# Patient Record
Sex: FEMALE | Race: WHITE | Employment: FULL TIME | URBAN - METROPOLITAN AREA
[De-identification: names, ages, dates, MRNs, and addresses within clinical notes are randomized per-mention and may not be internally consistent; named-entity substitution may affect disease eponyms.]

---

## 2017-05-27 ENCOUNTER — OFFICE VISIT (OUTPATIENT)
Dept: PRIMARY CARE CLINIC | Age: 52
End: 2017-05-27

## 2017-05-27 VITALS
HEART RATE: 70 BPM | BODY MASS INDEX: 21.53 KG/M2 | OXYGEN SATURATION: 99 % | TEMPERATURE: 97.5 F | WEIGHT: 134 LBS | HEIGHT: 66 IN | DIASTOLIC BLOOD PRESSURE: 68 MMHG | RESPIRATION RATE: 14 BRPM | SYSTOLIC BLOOD PRESSURE: 106 MMHG

## 2017-05-27 DIAGNOSIS — G43.511 INTRACTABLE PERSISTENT MIGRAINE AURA WITHOUT CEREBRAL INFARCTION AND WITH STATUS MIGRAINOSUS: Primary | ICD-10-CM

## 2017-05-27 DIAGNOSIS — J00 ACUTE NASOPHARYNGITIS: ICD-10-CM

## 2017-05-27 PROCEDURE — 99214 OFFICE O/P EST MOD 30 MIN: CPT | Performed by: INTERNAL MEDICINE

## 2017-05-27 RX ORDER — PROMETHAZINE HYDROCHLORIDE AND CODEINE PHOSPHATE 6.25; 1 MG/5ML; MG/5ML
5 SYRUP ORAL EVERY 4 HOURS PRN
Qty: 240 ML | Refills: 0 | Status: SHIPPED | OUTPATIENT
Start: 2017-05-27 | End: 2017-09-26 | Stop reason: SDUPTHER

## 2017-05-27 RX ORDER — BUTALBITAL, ACETAMINOPHEN, CAFFEINE AND CODEINE PHOSPHATE 50; 325; 40; 30 MG/1; MG/1; MG/1; MG/1
1 CAPSULE ORAL EVERY 6 HOURS PRN
Qty: 90 CAPSULE | Refills: 0 | Status: SHIPPED | OUTPATIENT
Start: 2017-05-27 | End: 2017-09-26

## 2017-05-27 RX ORDER — AZITHROMYCIN 250 MG/1
TABLET, FILM COATED ORAL
Qty: 1 PACKET | Refills: 1 | Status: SHIPPED | OUTPATIENT
Start: 2017-05-27 | End: 2017-09-26 | Stop reason: SDUPTHER

## 2017-05-29 ASSESSMENT — ENCOUNTER SYMPTOMS
SINUS PAIN: 1
ABDOMINAL DISTENTION: 0
BLOOD IN STOOL: 0
APNEA: 0
PHOTOPHOBIA: 0
FACIAL SWELLING: 0
RHINORRHEA: 1
CHOKING: 0
ABDOMINAL PAIN: 0

## 2017-09-26 ENCOUNTER — OFFICE VISIT (OUTPATIENT)
Dept: PRIMARY CARE CLINIC | Age: 52
End: 2017-09-26

## 2017-09-26 VITALS
RESPIRATION RATE: 16 BRPM | TEMPERATURE: 99.2 F | HEIGHT: 66 IN | DIASTOLIC BLOOD PRESSURE: 68 MMHG | OXYGEN SATURATION: 98 % | HEART RATE: 106 BPM | BODY MASS INDEX: 21.24 KG/M2 | SYSTOLIC BLOOD PRESSURE: 100 MMHG | WEIGHT: 132.2 LBS

## 2017-09-26 DIAGNOSIS — J30.1 SEASONAL ALLERGIC RHINITIS DUE TO POLLEN: ICD-10-CM

## 2017-09-26 DIAGNOSIS — J20.9 ACUTE BRONCHITIS, UNSPECIFIED ORGANISM: Primary | ICD-10-CM

## 2017-09-26 PROCEDURE — 99213 OFFICE O/P EST LOW 20 MIN: CPT | Performed by: INTERNAL MEDICINE

## 2017-09-26 RX ORDER — BECLOMETHASONE DIPROPIONATE 80 UG/1
1 AEROSOL, METERED NASAL 2 TIMES DAILY
Qty: 3 INHALER | Refills: 3 | Status: SHIPPED | OUTPATIENT
Start: 2017-09-26 | End: 2017-10-16 | Stop reason: SDUPTHER

## 2017-09-26 RX ORDER — PROMETHAZINE HYDROCHLORIDE AND CODEINE PHOSPHATE 6.25; 1 MG/5ML; MG/5ML
5 SYRUP ORAL 4 TIMES DAILY PRN
Qty: 118 ML | Refills: 0 | Status: SHIPPED | OUTPATIENT
Start: 2017-09-26 | End: 2018-09-20

## 2017-09-26 RX ORDER — TRIAMTERENE AND HYDROCHLOROTHIAZIDE 37.5; 25 MG/1; MG/1
TABLET ORAL
Refills: 0 | COMMUNITY
Start: 2017-08-03 | End: 2022-10-25

## 2017-09-26 RX ORDER — AZITHROMYCIN 250 MG/1
TABLET, FILM COATED ORAL
Qty: 1 PACKET | Refills: 1 | Status: SHIPPED | OUTPATIENT
Start: 2017-09-26 | End: 2017-10-06

## 2017-09-26 ASSESSMENT — PATIENT HEALTH QUESTIONNAIRE - PHQ9
2. FEELING DOWN, DEPRESSED OR HOPELESS: 0
SUM OF ALL RESPONSES TO PHQ9 QUESTIONS 1 & 2: 0
1. LITTLE INTEREST OR PLEASURE IN DOING THINGS: 0
SUM OF ALL RESPONSES TO PHQ QUESTIONS 1-9: 0

## 2017-09-26 ASSESSMENT — ENCOUNTER SYMPTOMS
RHINORRHEA: 1
FACIAL SWELLING: 0
APNEA: 0
BLOOD IN STOOL: 0
CHOKING: 0
PHOTOPHOBIA: 0
COUGH: 1
ABDOMINAL DISTENTION: 0

## 2017-10-16 DIAGNOSIS — J30.1 SEASONAL ALLERGIC RHINITIS DUE TO POLLEN: ICD-10-CM

## 2017-10-16 RX ORDER — BECLOMETHASONE DIPROPIONATE 80 UG/1
AEROSOL, METERED NASAL
Qty: 8.7 G | Refills: 3 | Status: SHIPPED | OUTPATIENT
Start: 2017-10-16

## 2018-01-22 ENCOUNTER — OFFICE VISIT (OUTPATIENT)
Dept: PRIMARY CARE CLINIC | Age: 53
End: 2018-01-22
Payer: OTHER GOVERNMENT

## 2018-01-22 VITALS
DIASTOLIC BLOOD PRESSURE: 62 MMHG | WEIGHT: 134.13 LBS | BODY MASS INDEX: 18.17 KG/M2 | HEIGHT: 72 IN | TEMPERATURE: 98.1 F | SYSTOLIC BLOOD PRESSURE: 104 MMHG | RESPIRATION RATE: 16 BRPM | HEART RATE: 78 BPM

## 2018-01-22 DIAGNOSIS — L72.0 EIC (EPIDERMAL INCLUSION CYST): Primary | ICD-10-CM

## 2018-01-22 DIAGNOSIS — D23.9 INTRADERMAL NEVUS: ICD-10-CM

## 2018-01-22 PROCEDURE — 11440 EXC FACE-MM B9+MARG 0.5 CM/<: CPT | Performed by: FAMILY MEDICINE

## 2018-01-22 PROCEDURE — 99213 OFFICE O/P EST LOW 20 MIN: CPT | Performed by: FAMILY MEDICINE

## 2018-01-22 RX ORDER — PROPRANOLOL HYDROCHLORIDE 10 MG/1
TABLET ORAL
Refills: 0 | COMMUNITY
Start: 2017-11-02

## 2018-01-22 ASSESSMENT — ENCOUNTER SYMPTOMS
APNEA: 0
CHOKING: 0
CHEST TIGHTNESS: 0
COLOR CHANGE: 0
EYE REDNESS: 0
NAUSEA: 0
WHEEZING: 0
CONSTIPATION: 0
DIARRHEA: 0
EYE DISCHARGE: 0
PHOTOPHOBIA: 0
FACIAL SWELLING: 0
ABDOMINAL PAIN: 0
STRIDOR: 0
EYE PAIN: 0

## 2018-01-22 NOTE — PROGRESS NOTES
Subjective:      Patient ID: Dave Mancera is a 47 y.o. female who presents today for:  Chief Complaint   Patient presents with    Cyst     Pt. has a cyst on left eye its hard and round X two years. Pt. states there are also spots on her nose and one on the left side of her scalp just past temporal area. Pt states there is no pain along with them just tired of them being there. HPI   Cyst  Patient is here today for cyst on left eye x 2 years. She states that it is hard and round. She also c/o spots on nose and also one on left side of her scalp just past temporal area. She denies pain in the area but is just tired of them being there.      Past Medical History:   Diagnosis Date    Abrasions, from her Pet Rat 5/8/2014    Acne     Allergic rhinitis     Allergic rhinitis 5/23/2012    Alopecia 4/10/2013    Ankle sprain 11/19/2014    Anxiety     Barium enema, cancelled, 11/29/12 11/29/2012    Chronic pain 10/26/2013    Colon stricture, reported 11/29/2012    Depression     Deviated septum 12/18/2012    Diverticulitis 7/24/2012    Diverticulosis 11/29/2012    ETD (eustachian tube dysfunction) 6/6/2016    Fatigue 4/10/2013    H/O endoscopy, 10/12, Perri 11/29/2012    IBS (irritable bowel syndrome) 8/21/2012    Insomnia 6/25/2012    Migraine headache 4/4/2014    Normal colonoscopy, 10/12, Perri 11/29/2012    OCD (obsessive compulsive disorder)     RAD (reactive airway disease) 7/16/2014    Right ankle pain 11/19/2014    Spondylisthesis     Telangiectasia, R ankle 4/4/2014    Tinnitus 6/6/2016     Past Surgical History:   Procedure Laterality Date    EYE SURGERY  1972    LAZY EYES     Family History   Problem Relation Age of Onset    High Blood Pressure Father     Other Father      TRIPLE BYPASS    Glaucoma Father      Social History     Social History    Marital status:      Spouse name: N/A    Number of children: N/A    Years of education: N/A     Occupational History

## 2018-04-11 ENCOUNTER — OFFICE VISIT (OUTPATIENT)
Dept: PRIMARY CARE CLINIC | Age: 53
End: 2018-04-11
Payer: OTHER GOVERNMENT

## 2018-04-11 VITALS
RESPIRATION RATE: 12 BRPM | BODY MASS INDEX: 22.33 KG/M2 | TEMPERATURE: 97.6 F | HEIGHT: 65 IN | DIASTOLIC BLOOD PRESSURE: 66 MMHG | WEIGHT: 134 LBS | SYSTOLIC BLOOD PRESSURE: 106 MMHG | HEART RATE: 64 BPM

## 2018-04-11 DIAGNOSIS — J30.1 ACUTE SEASONAL ALLERGIC RHINITIS DUE TO POLLEN: ICD-10-CM

## 2018-04-11 DIAGNOSIS — G43.511 INTRACTABLE PERSISTENT MIGRAINE AURA WITHOUT CEREBRAL INFARCTION AND WITH STATUS MIGRAINOSUS: ICD-10-CM

## 2018-04-11 DIAGNOSIS — H65.93 BILATERAL SEROUS OTITIS MEDIA, UNSPECIFIED CHRONICITY: Primary | ICD-10-CM

## 2018-04-11 PROCEDURE — 99214 OFFICE O/P EST MOD 30 MIN: CPT | Performed by: FAMILY MEDICINE

## 2018-04-11 RX ORDER — FLUCONAZOLE 150 MG/1
TABLET ORAL
Qty: 2 TABLET | Refills: 3 | Status: SHIPPED | OUTPATIENT
Start: 2018-04-11 | End: 2022-09-30

## 2018-04-11 RX ORDER — CEFDINIR 300 MG/1
300 CAPSULE ORAL 2 TIMES DAILY
Qty: 20 CAPSULE | Refills: 0 | Status: SHIPPED | OUTPATIENT
Start: 2018-04-11 | End: 2018-04-21

## 2018-04-11 RX ORDER — PREDNISONE 10 MG/1
TABLET ORAL
Qty: 30 TABLET | Refills: 0 | Status: SHIPPED | OUTPATIENT
Start: 2018-04-11 | End: 2018-09-20

## 2018-04-11 RX ORDER — BUTALBITAL, ACETAMINOPHEN, CAFFEINE AND CODEINE PHOSPHATE 50; 325; 40; 30 MG/1; MG/1; MG/1; MG/1
1 CAPSULE ORAL EVERY 6 HOURS PRN
Qty: 30 CAPSULE | Refills: 0 | Status: SHIPPED | OUTPATIENT
Start: 2018-04-11 | End: 2018-07-10

## 2018-04-11 ASSESSMENT — ENCOUNTER SYMPTOMS
GASTROINTESTINAL NEGATIVE: 1
SHORTNESS OF BREATH: 0
PHOTOPHOBIA: 0
COUGH: 0
RESPIRATORY NEGATIVE: 1
ABDOMINAL PAIN: 0
CONSTIPATION: 0
SWOLLEN GLANDS: 0
EYE ITCHING: 0
DIARRHEA: 0
SORE THROAT: 0
WHEEZING: 0
EYES NEGATIVE: 1
EYE REDNESS: 0
SINUS PRESSURE: 0
HOARSE VOICE: 0
BACK PAIN: 0

## 2018-05-09 ENCOUNTER — OFFICE VISIT (OUTPATIENT)
Dept: PRIMARY CARE CLINIC | Age: 53
End: 2018-05-09
Payer: OTHER GOVERNMENT

## 2018-05-09 VITALS
BODY MASS INDEX: 21.21 KG/M2 | SYSTOLIC BLOOD PRESSURE: 120 MMHG | RESPIRATION RATE: 14 BRPM | DIASTOLIC BLOOD PRESSURE: 80 MMHG | OXYGEN SATURATION: 95 % | HEART RATE: 64 BPM | HEIGHT: 66 IN | WEIGHT: 132 LBS | TEMPERATURE: 97.8 F

## 2018-05-09 DIAGNOSIS — L30.9 ECZEMA, UNSPECIFIED TYPE: Primary | ICD-10-CM

## 2018-05-09 DIAGNOSIS — J30.1 ACUTE SEASONAL ALLERGIC RHINITIS DUE TO POLLEN: ICD-10-CM

## 2018-05-09 DIAGNOSIS — L30.9 FACIAL DERMATITIS: ICD-10-CM

## 2018-05-09 PROCEDURE — 99213 OFFICE O/P EST LOW 20 MIN: CPT | Performed by: FAMILY MEDICINE

## 2018-05-09 ASSESSMENT — ENCOUNTER SYMPTOMS
RESPIRATORY NEGATIVE: 1
CONSTIPATION: 0
EYE PAIN: 0
EYE REDNESS: 0
EYE ITCHING: 0
DIARRHEA: 0
GASTROINTESTINAL NEGATIVE: 1
SORE THROAT: 0
WHEEZING: 0
EYES NEGATIVE: 1
BACK PAIN: 0
RHINORRHEA: 0
COUGH: 0
VOMITING: 0
SHORTNESS OF BREATH: 0
ABDOMINAL PAIN: 0
PHOTOPHOBIA: 0
NAIL CHANGES: 0

## 2018-05-24 ENCOUNTER — TELEPHONE (OUTPATIENT)
Dept: PRIMARY CARE CLINIC | Age: 53
End: 2018-05-24

## 2018-05-24 RX ORDER — PIMECROLIMUS 10 MG/G
CREAM TOPICAL 2 TIMES DAILY
Qty: 60 G | Refills: 2 | Status: SHIPPED | OUTPATIENT
Start: 2018-05-24 | End: 2018-09-20

## 2018-09-20 ENCOUNTER — OFFICE VISIT (OUTPATIENT)
Dept: PRIMARY CARE CLINIC | Age: 53
End: 2018-09-20
Payer: OTHER GOVERNMENT

## 2018-09-20 VITALS
WEIGHT: 138 LBS | HEIGHT: 66 IN | TEMPERATURE: 98.1 F | OXYGEN SATURATION: 99 % | HEART RATE: 68 BPM | SYSTOLIC BLOOD PRESSURE: 98 MMHG | BODY MASS INDEX: 22.18 KG/M2 | RESPIRATION RATE: 14 BRPM | DIASTOLIC BLOOD PRESSURE: 70 MMHG

## 2018-09-20 DIAGNOSIS — E78.5 HYPERLIPIDEMIA, UNSPECIFIED HYPERLIPIDEMIA TYPE: ICD-10-CM

## 2018-09-20 DIAGNOSIS — J30.1 SEASONAL ALLERGIC RHINITIS DUE TO POLLEN: ICD-10-CM

## 2018-09-20 DIAGNOSIS — E55.9 VITAMIN D DEFICIENCY: ICD-10-CM

## 2018-09-20 DIAGNOSIS — G43.501 PERSISTENT MIGRAINE AURA WITHOUT CEREBRAL INFARCTION AND WITH STATUS MIGRAINOSUS, NOT INTRACTABLE: ICD-10-CM

## 2018-09-20 DIAGNOSIS — R53.83 FATIGUE, UNSPECIFIED TYPE: Primary | ICD-10-CM

## 2018-09-20 DIAGNOSIS — F41.9 ANXIETY: ICD-10-CM

## 2018-09-20 DIAGNOSIS — J20.9 ACUTE BRONCHITIS, UNSPECIFIED ORGANISM: ICD-10-CM

## 2018-09-20 PROCEDURE — 99214 OFFICE O/P EST MOD 30 MIN: CPT | Performed by: FAMILY MEDICINE

## 2018-09-20 RX ORDER — BUTALBITAL, ACETAMINOPHEN, CAFFEINE AND CODEINE PHOSPHATE 50; 325; 40; 30 MG/1; MG/1; MG/1; MG/1
1 CAPSULE ORAL EVERY 6 HOURS PRN
Qty: 30 CAPSULE | Refills: 0 | Status: SHIPPED | OUTPATIENT
Start: 2018-09-20 | End: 2018-12-19

## 2018-09-20 RX ORDER — AZITHROMYCIN 250 MG/1
TABLET, FILM COATED ORAL
Qty: 1 PACKET | Refills: 0 | Status: SHIPPED | OUTPATIENT
Start: 2018-09-20 | End: 2018-09-24

## 2018-09-20 RX ORDER — PROMETHAZINE HYDROCHLORIDE AND CODEINE PHOSPHATE 6.25; 1 MG/5ML; MG/5ML
5 SYRUP ORAL EVERY 4 HOURS PRN
Qty: 120 ML | Refills: 0 | Status: SHIPPED | OUTPATIENT
Start: 2018-09-20 | End: 2018-09-27

## 2018-09-20 ASSESSMENT — ENCOUNTER SYMPTOMS
BACK PAIN: 0
HEMOPTYSIS: 0
GASTROINTESTINAL NEGATIVE: 1
WHEEZING: 0
HEARTBURN: 0
EYE ITCHING: 0
DIARRHEA: 0
EYES NEGATIVE: 1
RHINORRHEA: 0
SHORTNESS OF BREATH: 0
CHEST TIGHTNESS: 1
COUGH: 1
CONSTIPATION: 0
EYE REDNESS: 0
PHOTOPHOBIA: 0

## 2018-09-20 NOTE — PROGRESS NOTES
VITAL SIGNS: are as recorded. GENERAL:  The patient appears well nourished and well-developed, and with normal affect. No acute respiratory distress. Alert and oriented times 3. No skin rashes. HEENT:  TMs normal bilaterally. Canals and ears normal. Pharynx is clear. Extraocular eye motions intact and pain free. Pupils reactive and equally round. Sclerae and conjunctivae clear, normocephalic and atraumatic. RESPIRATORY:  Clear and equal breath sounds with no acute respiratory distress. HEART: Regular rhythm without murmur, rub or gallop. ABDOMEN:  soft, nontender. No masses, guarding or rebound. Normoactive bowel sounds. LOW BACK: No tenderness to palpation of inferior lumbar spine or either sacroiliac joint area. Assessment:      Diagnosis Orders   1. Fatigue, unspecified type  CBC Auto Differential    TSH without Reflex   2. Persistent migraine aura without cerebral infarction and with status migrainosus, not intractable  butalbital-acetaminophen-caffeine-codeine (FIORICET WITH CODEINE) -12-30 MG per capsule   3. Anxiety     4. Seasonal allergic rhinitis due to pollen     5. Vitamin D deficiency  Vitamin D 25 Hydroxy   6. Hyperlipidemia, unspecified hyperlipidemia type  Comprehensive Metabolic Panel    Lipid Panel   7.  Acute bronchitis, unspecified organism  azithromycin (ZITHROMAX Z-NILAY) 250 MG tablet    promethazine-codeine (PHENERGAN WITH CODEINE) 6.25-10 MG/5ML syrup       Plan:      Orders Placed This Encounter   Procedures    CBC Auto Differential     Standing Status:   Future     Standing Expiration Date:   9/20/2019    Comprehensive Metabolic Panel     Standing Status:   Future     Standing Expiration Date:   9/20/2019    Lipid Panel     Standing Status:   Future     Standing Expiration Date:   10/20/2018     Order Specific Question:   Is Patient Fasting?/# of Hours     Answer:   yes    Vitamin D 25 Hydroxy     Standing Status:   Future     Standing Expiration Date:   9/20/2019    TSH without Reflex     Standing Status:   Future     Standing Expiration Date:   9/20/2019     Orders Placed This Encounter   Medications    butalbital-acetaminophen-caffeine-codeine (FIORICET WITH CODEINE) -62-30 MG per capsule     Sig: Take 1 capsule by mouth every 6 hours as needed for Migraine for up to 90 days. .     Dispense:  30 capsule     Refill:  0    azithromycin (ZITHROMAX Z-NILAY) 250 MG tablet     Sig: Take 2 tablets (500 mg) on Day 1, and then take 1 tablet (250 mg) on days 2 through 5. Dispense:  1 packet     Refill:  0    promethazine-codeine (PHENERGAN WITH CODEINE) 6.25-10 MG/5ML syrup     Sig: Take 5 mLs by mouth every 4 hours as needed for Cough for up to 7 days. .     Dispense:  120 mL     Refill:  0     BW in [place] next    Controlled Substances Monitoring:      Return in about 4 weeks (around 10/18/2018). IRosalie ([facility]), am scribing for and in the presence of Yury Gonzalez MD. Electronically signed by :  Daphne Madden CMA (Jaycee Das MD, personally performed the services described in this documentation, as scribed by Daphne Madden CMA ([facility])   in my presence, and it is both accurate and complete.  Electronically signed by: Yury Gonzalez MD    9/21/18 5:58 AM    Yury Gonzalez MD

## 2018-09-26 ENCOUNTER — TELEPHONE (OUTPATIENT)
Dept: PRIMARY CARE CLINIC | Age: 53
End: 2018-09-26

## 2018-09-26 RX ORDER — CEFDINIR 300 MG/1
300 CAPSULE ORAL 2 TIMES DAILY
Qty: 20 CAPSULE | Refills: 0 | Status: SHIPPED | OUTPATIENT
Start: 2018-09-26 | End: 2018-10-06

## 2018-09-26 NOTE — TELEPHONE ENCOUNTER
Pt is seen you you on Friday. She is not feeling better. She did have an appointment with Dr Mili Yanez but is not feeling better. She had to cancel her an appointment for today. She is asking if you can send a prescription to [facility] in [place].

## 2018-09-27 ENCOUNTER — OFFICE VISIT (OUTPATIENT)
Dept: PRIMARY CARE CLINIC | Age: 53
End: 2018-09-27
Payer: OTHER GOVERNMENT

## 2018-09-27 VITALS
OXYGEN SATURATION: 95 % | TEMPERATURE: 98.5 F | HEART RATE: 68 BPM | SYSTOLIC BLOOD PRESSURE: 112 MMHG | HEIGHT: 66 IN | DIASTOLIC BLOOD PRESSURE: 68 MMHG | WEIGHT: 138 LBS | BODY MASS INDEX: 22.18 KG/M2 | RESPIRATION RATE: 14 BRPM

## 2018-09-27 DIAGNOSIS — F41.9 ANXIETY: ICD-10-CM

## 2018-09-27 DIAGNOSIS — J45.909 MILD REACTIVE AIRWAYS DISEASE, UNSPECIFIED WHETHER PERSISTENT: ICD-10-CM

## 2018-09-27 DIAGNOSIS — H10.9 CONJUNCTIVITIS OF BOTH EYES, UNSPECIFIED CONJUNCTIVITIS TYPE: ICD-10-CM

## 2018-09-27 DIAGNOSIS — R05.9 COUGH: ICD-10-CM

## 2018-09-27 DIAGNOSIS — J30.1 SEASONAL ALLERGIC RHINITIS DUE TO POLLEN: ICD-10-CM

## 2018-09-27 DIAGNOSIS — J20.9 ACUTE BRONCHITIS, UNSPECIFIED ORGANISM: Primary | ICD-10-CM

## 2018-09-27 PROCEDURE — 99214 OFFICE O/P EST MOD 30 MIN: CPT | Performed by: FAMILY MEDICINE

## 2018-09-27 RX ORDER — SULFACETAMIDE SODIUM 100 MG/ML
2 SOLUTION/ DROPS OPHTHALMIC 4 TIMES DAILY
Qty: 15 ML | Refills: 0 | Status: SHIPPED | OUTPATIENT
Start: 2018-09-27 | End: 2018-10-07

## 2018-09-27 RX ORDER — CEFTRIAXONE 1 G/1
1 INJECTION, POWDER, FOR SOLUTION INTRAMUSCULAR; INTRAVENOUS ONCE
Status: CANCELLED | OUTPATIENT
Start: 2018-09-27 | End: 2018-09-27

## 2018-09-27 RX ORDER — PREDNISONE 10 MG/1
TABLET ORAL
Qty: 30 TABLET | Refills: 0 | Status: SHIPPED | OUTPATIENT
Start: 2018-09-27 | End: 2018-10-07

## 2018-09-27 ASSESSMENT — ENCOUNTER SYMPTOMS
ABDOMINAL PAIN: 0
EYE DISCHARGE: 1
APNEA: 0
CHEST TIGHTNESS: 0
FOREIGN BODY SENSATION: 0
HEMOPTYSIS: 0
SORE THROAT: 0
CONSTIPATION: 0
CHOKING: 0
PHOTOPHOBIA: 0
SHORTNESS OF BREATH: 0
EYE ITCHING: 0
EYE REDNESS: 1
EYE PAIN: 0
COUGH: 1
COLOR CHANGE: 0
HEARTBURN: 0
VOMITING: 0
STRIDOR: 0
FACIAL SWELLING: 0
RHINORRHEA: 0
DOUBLE VISION: 0
NAUSEA: 0
WHEEZING: 0
DIARRHEA: 0
BLURRED VISION: 0

## 2018-09-27 ASSESSMENT — PATIENT HEALTH QUESTIONNAIRE - PHQ9
SUM OF ALL RESPONSES TO PHQ QUESTIONS 1-9: 0
2. FEELING DOWN, DEPRESSED OR HOPELESS: 0
1. LITTLE INTEREST OR PLEASURE IN DOING THINGS: 0
SUM OF ALL RESPONSES TO PHQ QUESTIONS 1-9: 0
SUM OF ALL RESPONSES TO PHQ9 QUESTIONS 1 & 2: 0

## 2019-01-28 ENCOUNTER — OFFICE VISIT (OUTPATIENT)
Dept: PRIMARY CARE CLINIC | Age: 54
End: 2019-01-28
Payer: OTHER GOVERNMENT

## 2019-01-28 VITALS
TEMPERATURE: 97.7 F | SYSTOLIC BLOOD PRESSURE: 130 MMHG | HEART RATE: 71 BPM | HEIGHT: 66 IN | RESPIRATION RATE: 14 BRPM | WEIGHT: 134 LBS | DIASTOLIC BLOOD PRESSURE: 80 MMHG | BODY MASS INDEX: 21.53 KG/M2 | OXYGEN SATURATION: 99 %

## 2019-01-28 DIAGNOSIS — G43.501 PERSISTENT MIGRAINE AURA WITHOUT CEREBRAL INFARCTION AND WITH STATUS MIGRAINOSUS, NOT INTRACTABLE: ICD-10-CM

## 2019-01-28 DIAGNOSIS — J45.909 MILD REACTIVE AIRWAYS DISEASE, UNSPECIFIED WHETHER PERSISTENT: ICD-10-CM

## 2019-01-28 DIAGNOSIS — K57.92 DIVERTICULITIS: Primary | ICD-10-CM

## 2019-01-28 PROCEDURE — 99214 OFFICE O/P EST MOD 30 MIN: CPT | Performed by: FAMILY MEDICINE

## 2019-01-28 RX ORDER — BUTALBITAL, ACETAMINOPHEN AND CAFFEINE 50; 325; 40 MG/1; MG/1; MG/1
1 TABLET ORAL EVERY 6 HOURS PRN
Qty: 30 TABLET | Refills: 1 | Status: SHIPPED | OUTPATIENT
Start: 2019-01-28

## 2019-01-28 RX ORDER — BUTALBITAL, ACETAMINOPHEN, CAFFEINE AND CODEINE PHOSPHATE 50; 325; 40; 30 MG/1; MG/1; MG/1; MG/1
1 CAPSULE ORAL EVERY 6 HOURS PRN
Qty: 30 CAPSULE | Refills: 0 | Status: CANCELLED | OUTPATIENT
Start: 2019-01-28 | End: 2019-04-28

## 2019-01-28 RX ORDER — ALBUTEROL SULFATE 90 UG/1
2 AEROSOL, METERED RESPIRATORY (INHALATION) EVERY 6 HOURS PRN
Qty: 3 INHALER | Refills: 3 | Status: SHIPPED | OUTPATIENT
Start: 2019-01-28

## 2019-01-28 ASSESSMENT — ENCOUNTER SYMPTOMS
CONSTIPATION: 0
ABDOMINAL PAIN: 1
SORE THROAT: 0
CHOKING: 0
CHEST TIGHTNESS: 0
COLOR CHANGE: 0
SWOLLEN GLANDS: 0
RHINORRHEA: 0
APNEA: 0
SHORTNESS OF BREATH: 0
VOMITING: 0
WHEEZING: 1
HEMOPTYSIS: 0
SPUTUM PRODUCTION: 0
EYE DISCHARGE: 0
NAUSEA: 0
DIARRHEA: 0
PHOTOPHOBIA: 0
EYE REDNESS: 0
STRIDOR: 0
EYE PAIN: 0
COUGH: 0
FACIAL SWELLING: 0

## 2023-03-13 ENCOUNTER — OFFICE VISIT (OUTPATIENT)
Dept: PRIMARY CARE | Facility: CLINIC | Age: 58
End: 2023-03-13
Payer: OTHER GOVERNMENT

## 2023-03-13 VITALS
TEMPERATURE: 97.6 F | OXYGEN SATURATION: 98 % | SYSTOLIC BLOOD PRESSURE: 109 MMHG | DIASTOLIC BLOOD PRESSURE: 75 MMHG | BODY MASS INDEX: 23.46 KG/M2 | WEIGHT: 141 LBS | HEART RATE: 84 BPM | RESPIRATION RATE: 16 BRPM

## 2023-03-13 DIAGNOSIS — R06.09 DYSPNEA ON EXERTION: ICD-10-CM

## 2023-03-13 DIAGNOSIS — R68.89 FLU-LIKE SYMPTOMS: ICD-10-CM

## 2023-03-13 DIAGNOSIS — J40 BRONCHITIS: Primary | ICD-10-CM

## 2023-03-13 DIAGNOSIS — R06.2 WHEEZING: ICD-10-CM

## 2023-03-13 DIAGNOSIS — R05.3 PERSISTENT COUGH: ICD-10-CM

## 2023-03-13 PROCEDURE — 1036F TOBACCO NON-USER: CPT | Performed by: NURSE PRACTITIONER

## 2023-03-13 PROCEDURE — 87636 SARSCOV2 & INF A&B AMP PRB: CPT

## 2023-03-13 PROCEDURE — U0005 INFEC AGEN DETEC AMPLI PROBE: HCPCS

## 2023-03-13 PROCEDURE — 99213 OFFICE O/P EST LOW 20 MIN: CPT | Performed by: NURSE PRACTITIONER

## 2023-03-13 RX ORDER — BROMPHENIRAMINE MALEATE, PSEUDOEPHEDRINE HYDROCHLORIDE, AND DEXTROMETHORPHAN HYDROBROMIDE 2; 30; 10 MG/5ML; MG/5ML; MG/5ML
5 SYRUP ORAL 4 TIMES DAILY PRN
Qty: 120 ML | Refills: 2 | Status: SHIPPED | OUTPATIENT
Start: 2023-03-13 | End: 2023-03-23

## 2023-03-13 RX ORDER — IBUPROFEN 400 MG/1
400 TABLET ORAL EVERY 6 HOURS
COMMUNITY

## 2023-03-13 RX ORDER — METAXALONE 800 MG/1
800 TABLET ORAL NIGHTLY
COMMUNITY
Start: 2022-06-22 | End: 2023-08-01 | Stop reason: ALTCHOICE

## 2023-03-13 RX ORDER — BUTALBITAL, ACETAMINOPHEN AND CAFFEINE 50; 325; 40 MG/1; MG/1; MG/1
1 TABLET ORAL 3 TIMES DAILY PRN
COMMUNITY
End: 2024-03-05 | Stop reason: ALTCHOICE

## 2023-03-13 RX ORDER — CEFDINIR 300 MG/1
300 CAPSULE ORAL 2 TIMES DAILY
Qty: 20 CAPSULE | Refills: 0 | Status: SHIPPED | OUTPATIENT
Start: 2023-03-13 | End: 2023-03-23

## 2023-03-13 RX ORDER — ZOLPIDEM TARTRATE 10 MG/1
10 TABLET ORAL NIGHTLY PRN
COMMUNITY

## 2023-03-13 RX ORDER — MULTIVITAMIN
1 TABLET ORAL DAILY
COMMUNITY

## 2023-03-13 RX ORDER — ACETAMINOPHEN 500 MG
500 TABLET ORAL EVERY 6 HOURS PRN
COMMUNITY

## 2023-03-13 RX ORDER — ESTRADIOL 0.1 MG/G
CREAM VAGINAL
COMMUNITY

## 2023-03-13 RX ORDER — CYCLOBENZAPRINE HCL 10 MG
10 TABLET ORAL
COMMUNITY
Start: 2023-01-22 | End: 2023-03-28 | Stop reason: SDUPTHER

## 2023-03-13 RX ORDER — PROPRANOLOL HYDROCHLORIDE 10 MG/1
10 TABLET ORAL DAILY
COMMUNITY
Start: 2023-02-28

## 2023-03-13 RX ORDER — ALBUTEROL SULFATE 90 UG/1
2 AEROSOL, METERED RESPIRATORY (INHALATION) EVERY 4 HOURS PRN
Qty: 6.7 G | Refills: 0 | Status: SHIPPED | OUTPATIENT
Start: 2023-03-13 | End: 2024-03-12

## 2023-03-13 RX ORDER — DICLOFENAC SODIUM 10 MG/G
GEL TOPICAL
COMMUNITY
Start: 2021-06-28

## 2023-03-13 RX ORDER — ADHESIVE TAPE 1.5"X360"
TAPE, NON-MEDICATED TOPICAL
COMMUNITY

## 2023-03-13 RX ORDER — ALPRAZOLAM 0.5 MG/1
0.5 TABLET, EXTENDED RELEASE ORAL 3 TIMES DAILY PRN
COMMUNITY

## 2023-03-13 RX ORDER — CELECOXIB 200 MG/1
200 CAPSULE ORAL 2 TIMES DAILY
COMMUNITY
Start: 2020-10-13 | End: 2023-10-09 | Stop reason: ALTCHOICE

## 2023-03-13 RX ORDER — PREDNISONE 10 MG/1
TABLET ORAL
Qty: 30 TABLET | Refills: 0 | Status: SHIPPED | OUTPATIENT
Start: 2023-03-13 | End: 2023-03-23

## 2023-03-13 RX ORDER — ALBUTEROL SULFATE 90 UG/1
AEROSOL, METERED RESPIRATORY (INHALATION)
COMMUNITY
Start: 2022-11-15 | End: 2023-03-13 | Stop reason: WASHOUT

## 2023-03-13 NOTE — PROGRESS NOTES
Subjective   Patient ID: Tiffanie Maldonado is a 57 y.o. female who presents for No chief complaint on file..    HPI     OTC:  Covid Vaccinated:      Review of Systems    Objective   There were no vitals taken for this visit.    Physical Exam    Assessment/Plan

## 2023-03-13 NOTE — PROGRESS NOTES
Subjective   Patient ID: Tiffanie Maldonado is a 57 y.o. female who is with a chief complaint of symptoms of respiratory tract infection.     HPI  Patient is a 57 y.o. female who CONSULTED AT OFFICE CLINIC today. Patient is with complaints of nasal congestion, nasal discharge, headache / sinus pain, sore throat, painful swallowing, red enlarged tonsils, post nasal drip, cough, shortness of breath on exertion, intermittent wheezing, fatigue, muscle ache, loss of sense of taste, and chills. She denies having loss of sense of smell, diarrhea, nor fever. Patient states that present condition started about 5 days ago. Patient denies history of recent travel, exposure to person/people who tested positive for COVID 19, nor exposure to person/people with flu like symptoms. she denies shortness of breath, chest pain, palpitations, nor edema. she stated that she  tried OTC medications which afforded only slight relief of symptoms. she denies nausea, vomiting, abdominal pain, nor any other symptoms.    Patient states she had her COVID vaccine.  Patient states she had the flu shot for this season.  -----------------------------------------------------------  (URI (Onset: 03.09.2023), Nasal Congestion, Headache (Pain Scale: 5 out of 10, Constant. ), Sinusitis (Sinus pressure. ), Ear Fullness (BILATERAL. Right is worst. ), Sore Throat (Comes and goes. Post Nasal Drip. ), Cough (Productive ---yellow to olive green mucous. Coughing fits. Denies Gagging or vomiting. Chest burns from coughing. ), and Fatigue)  note by - Grecia Alvarez MA   ------------------------------------------------------------------------------  Review of Systems  General: no weight loss, generally healthy, (+) fatigue  Head: (+) headaches / sinus pain, no vertigo, no injury  Eyes: no diplopia, no tearing, no pain,   Ears: no change in hearing, no tinnitus, no bleeding, no vertigo  Mouth:  no dental difficulties, no gingival bleeding, (+) sore throat, (+)  loss of sense of taste, (+) painful swallowing, (+) red enlarged tonsils, (+) post nasal drip,   Nose: (+) congestion, (+) discharge, no bleeding, no obstruction, no loss of sense of smell  Neck: no stiffness, no pain, no tenderness, no masses, no bruit  Pulmonary: (+) dyspnea on exertion, (+) intermittent wheezing, no hemoptysis, (+) cough  Cardiovascular: no chest pain, no palpitations, no syncope, no orthopnea  Gastrointestinal: no change in appetite, no dysphagia, no abdominal pains, no diarrhea, no emesis, no melena  Genito Urinary: no dysuria, no urinary urgency, no nocturia, no incontinence, no change in nature of urine  Musculoskeletal: (+) muscle ache, no joint pain, no limitation of range of motion, no paresthesia, no numbness  Constitutional: no fever, (+) chills, no night sweats    Objective   Physical Exam  General: ambulatory, in no acute distress  Head: normocephalic, no lesions, (+) sinus tenderness  Eyes: pink palpebral conjunctiva, anicteric sclerae, PERRLA, EOM's full  Ears: clear external auditory canals, no ear discharge, no bleeding from the ears, tympanic membrane intact  Nose: (+) congested nasal mucosa, (+) yellow mucoid nasal discharge, no bleeding, no obstruction  Throat: (+) erythema, and (+) exudate on posterior pharyngeal wall, no lesion  Neck: supple, no masses, no bruits, no CLADP  Chest: symmetrical chest expansion, no lagging, no retractions, clear breath sounds, no rales, no wheezes  Heart: regular rate, normal rhythm, no heaves, no thrills, no murmurs  Abdomen: soft, non-tender, normoactive bowel sounds, no mass palpated  Musculoskeletal: no limitation of range of motion, no paralysis, no deformity  Extremities: full and equal peripheral pulses, no edema,    Assessment/Plan     DISCHARGE SUMMARY:   Patient was seen and examined. Diagnosis, treatment, treatment options, and possible complications of today's illness discussed and explained to patient. Patient to take medication/s  associated with this visit. Patient may also take OTC analgesic/antipyretic if needed for pain/fever. Advised to increase oral fluid intake. Advised steam inhalation if needed to relieve congestion. Advised warm saline gargle if needed to relieve throat discomfort. Advised to come back if with worsening or persistent symptoms. Patient verbalized understanding of plan of care.    Patient to come back in 7 - 10 days if needed for worsening symptoms.

## 2023-03-14 LAB
FLU A RESULT: NOT DETECTED
FLU B RESULT: NOT DETECTED
SARS-COV-2 RESULT: NOT DETECTED

## 2023-03-14 NOTE — PATIENT INSTRUCTIONS
DISCHARGE SUMMARY:   Patient was seen and examined. Diagnosis, treatment, treatment options, and possible complications of today's illness discussed and explained to patient. Patient to take medication/s associated with this visit. Patient may also take OTC analgesic/antipyretic if needed for pain/fever. Advised to increase oral fluid intake. Advised steam inhalation if needed to relieve congestion. Advised warm saline gargle if needed to relieve throat discomfort. Advised to come back if with worsening or persistent symptoms. Patient verbalized understanding of plan of care.    Patient to come back in 7 - 10 days if needed for worsening symptoms.

## 2023-03-20 ENCOUNTER — OFFICE VISIT (OUTPATIENT)
Dept: PRIMARY CARE | Facility: CLINIC | Age: 58
End: 2023-03-20
Payer: OTHER GOVERNMENT

## 2023-03-20 VITALS
RESPIRATION RATE: 14 BRPM | DIASTOLIC BLOOD PRESSURE: 64 MMHG | OXYGEN SATURATION: 96 % | BODY MASS INDEX: 23.16 KG/M2 | HEART RATE: 68 BPM | HEIGHT: 65 IN | WEIGHT: 139 LBS | SYSTOLIC BLOOD PRESSURE: 102 MMHG

## 2023-03-20 DIAGNOSIS — J40 BRONCHITIS: Primary | ICD-10-CM

## 2023-03-20 PROCEDURE — 99214 OFFICE O/P EST MOD 30 MIN: CPT | Performed by: FAMILY MEDICINE

## 2023-03-20 ASSESSMENT — ENCOUNTER SYMPTOMS
HEADACHES: 1
RHINORRHEA: 1
COUGH: 1
SINUS PAIN: 1

## 2023-03-20 NOTE — PROGRESS NOTES
"Subjective   Patient ID: Tiffanie Maldonado is a 57 y.o. female who presents for Sinusitis (Pt states this has been ongoing for 2 weeks, she seen Chuckie last week and was prescribed cefdinir.), Cough, Headache, and URI.    URI   This is a new problem. The current episode started 1 to 4 weeks ago. The problem has been unchanged. Associated symptoms include congestion, coughing, headaches, rhinorrhea and sinus pain. Treatments tried: cefdinir. The treatment provided mild relief.        Review of Systems   HENT:  Positive for congestion, rhinorrhea and sinus pain.    Respiratory:  Positive for cough.    Neurological:  Positive for headaches.       Objective   /64 (BP Location: Left arm, Patient Position: Sitting, BP Cuff Size: Adult)   Pulse 68   Resp 14   Ht 1.651 m (5' 5\")   Wt 63 kg (139 lb)   SpO2 96%   BMI 23.13 kg/m²     Physical Exam CONSTITUTIONAL- NAD, Pt is A & O x3, Vital signs reviewed per chart.  General Appearance- normal , good hygiene,talks easily  EYES-PERRLA conjunctiva and lids- normal  EARS/NOSE-TM's normal, head normocephalic and atraumatic, naso pharynx-no nasal discharge, no trismus,  oropharynx- normal without exudate  NECK- supple, FROM, without mass  thyroid- NT, normal size, no nodule noted  LYMPH- WNL  CV- RRR without murmur, gallop, or other abnormality.  PULM- CTA bilaterally  Respiratory effort- normal respiratory effort , no retractions or nasal flaring  ABDOMEN- normoactive BS's , soft , NT, no hepatosplenomegaly palpated, or masses. No pulsatile masses noted  extremities no edema,NT  SKIN- no abnormal skin lesions on inspection or palpation  neuro- no focal deficits  PSYCH- pleasant, normal judgement and insight     Rales and rhonchi on exam left>right     Assessment/Plan   Problem List Items Addressed This Visit    None  Visit Diagnoses       Bronchitis    -  Primary    Relevant Medications    fluticasone-umeclidin-vilanter (Trelegy Ellipta) 100-62.5-25 mcg blister with " device    Other Relevant Orders    XR chest 2 views (Completed)             Scribe Attestation  By signing my name below, I, Ag Hart DO  , Scribe   attest that this documentation has been prepared under the direction and in the presence of Ag Hart DO.

## 2023-03-21 RX ORDER — FLUTICASONE FUROATE, UMECLIDINIUM BROMIDE AND VILANTEROL TRIFENATATE 100; 62.5; 25 UG/1; UG/1; UG/1
1 POWDER RESPIRATORY (INHALATION) DAILY
Qty: 28 EACH | Refills: 0 | COMMUNITY
Start: 2023-03-21 | End: 2023-08-01 | Stop reason: ALTCHOICE

## 2023-03-28 ENCOUNTER — TELEPHONE (OUTPATIENT)
Dept: PRIMARY CARE | Facility: CLINIC | Age: 58
End: 2023-03-28

## 2023-03-28 DIAGNOSIS — M19.041 PRIMARY OSTEOARTHRITIS OF BOTH HANDS: ICD-10-CM

## 2023-03-28 DIAGNOSIS — M19.042 PRIMARY OSTEOARTHRITIS OF BOTH HANDS: ICD-10-CM

## 2023-03-28 NOTE — TELEPHONE ENCOUNTER
REFILL REQUEST FOR FLEXERIL: PT HAD AN APPT LAST WEEK AND FORGOT TO ASK      cyclobenzaprine (Flexeril) 10 mg   tablet Take 1 tablet (10 mg) by mouth every 6 hours during the day.     [id number] - [place], [address] Phone: [phone]   Fax: [phone]

## 2023-03-29 RX ORDER — CYCLOBENZAPRINE HCL 10 MG
10 TABLET ORAL NIGHTLY
Qty: 30 TABLET | Refills: 3 | Status: SHIPPED | OUTPATIENT
Start: 2023-03-29

## 2023-08-01 ENCOUNTER — OFFICE VISIT (OUTPATIENT)
Dept: PRIMARY CARE | Facility: CLINIC | Age: 58
End: 2023-08-01
Payer: OTHER GOVERNMENT

## 2023-08-01 VITALS
BODY MASS INDEX: 23.66 KG/M2 | HEIGHT: 65 IN | OXYGEN SATURATION: 98 % | SYSTOLIC BLOOD PRESSURE: 124 MMHG | HEART RATE: 77 BPM | WEIGHT: 142 LBS | DIASTOLIC BLOOD PRESSURE: 68 MMHG | RESPIRATION RATE: 14 BRPM

## 2023-08-01 DIAGNOSIS — M54.12 CERVICAL RADICULOPATHY: ICD-10-CM

## 2023-08-01 DIAGNOSIS — B35.1 ONYCHOMYCOSIS: ICD-10-CM

## 2023-08-01 DIAGNOSIS — M54.2 NECK PAIN: ICD-10-CM

## 2023-08-01 DIAGNOSIS — R53.83 OTHER FATIGUE: ICD-10-CM

## 2023-08-01 DIAGNOSIS — H11.32 SUBCONJUNCTIVAL HEMORRHAGE OF LEFT EYE: ICD-10-CM

## 2023-08-01 PROBLEM — E55.9 VITAMIN D DEFICIENCY: Status: ACTIVE | Noted: 2023-08-01

## 2023-08-01 PROBLEM — F54 PSYCHOLOGICAL FACTORS AFFECTING MEDICAL CONDITION: Status: ACTIVE | Noted: 2023-08-01

## 2023-08-01 PROBLEM — J34.2 DEVIATED SEPTUM: Status: ACTIVE | Noted: 2023-08-01

## 2023-08-01 PROBLEM — M51.36 DISC DEGENERATION, LUMBAR: Status: ACTIVE | Noted: 2023-08-01

## 2023-08-01 PROBLEM — M51.369 DISC DEGENERATION, LUMBAR: Status: ACTIVE | Noted: 2023-08-01

## 2023-08-01 PROBLEM — H90.3 BILATERAL HIGH FREQUENCY SENSORINEURAL HEARING LOSS: Status: ACTIVE | Noted: 2023-08-01

## 2023-08-01 PROCEDURE — 99214 OFFICE O/P EST MOD 30 MIN: CPT | Performed by: FAMILY MEDICINE

## 2023-08-01 RX ORDER — TERBINAFINE HYDROCHLORIDE 250 MG/1
250 TABLET ORAL DAILY
Qty: 30 TABLET | Refills: 2 | Status: SHIPPED | OUTPATIENT
Start: 2023-08-01 | End: 2023-11-14

## 2023-08-01 RX ORDER — CICLOPIROX 80 MG/ML
SOLUTION TOPICAL NIGHTLY
Qty: 6.6 ML | Refills: 3 | Status: SHIPPED | OUTPATIENT
Start: 2023-08-01

## 2023-08-01 ASSESSMENT — ENCOUNTER SYMPTOMS
PSYCHIATRIC NEGATIVE: 1
NEUROLOGICAL NEGATIVE: 1
EYES NEGATIVE: 1
NECK PAIN: 1
RESPIRATORY NEGATIVE: 1
CONSTITUTIONAL NEGATIVE: 1
CARDIOVASCULAR NEGATIVE: 1
GASTROINTESTINAL NEGATIVE: 1
HEMATOLOGIC/LYMPHATIC NEGATIVE: 1
ALLERGIC/IMMUNOLOGIC NEGATIVE: 1
ENDOCRINE NEGATIVE: 1
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
## 2023-08-18 LAB
ALLERGEN ANIMAL: CAT DANDER IGE (KU/L): 2.19 KU/L
ALLERGEN ANIMAL: DOG DANDER IGE (KU/L): 3.77 KU/L
ALLERGEN GRASS: BERMUDA GRASS (CYNODON DACTYLON) IGE (KU/L): <0.1 KU/L
ALLERGEN GRASS: JOHNSON GRASS (SORGHUM HALEPENSE) IGE (KU/L): <0.1 KU/L
ALLERGEN GRASS: MEADOW GRASS, KENTUCKY BLUE (POA PRATENSIS )IGE (KU/L): 0.81 KU/L
ALLERGEN GRASS: TIMOTHY GRASS (PHLEUM PRATENSE) IGE (KU/L): 0.38 KU/L
ALLERGEN INSECT: COCKROACH IGE: <0.1 KU/L
ALLERGEN MICROORGANISM: ALTERNARIA ALTERNATA IGE (KU/L): 0.83 KU/L
ALLERGEN MICROORGANISM: ASPERGILLUS FUMIGATUS IGE (KU/L): <0.1 KU/L
ALLERGEN MICROORGANISM: CLADOSPORIUM HERBARUM IGE (KU/L): 0.11 KU/L
ALLERGEN MICROORGANISM: PENICILLIUM CHRYSOGENUM (P. NOTATUM) IGE (KU/L): <0.1 KU/L
ALLERGEN MITE: DERMATOPHAGOIDES FARINAE (HOUSE DUST MITE) IGE (KU/L): 3.5 KU/L
ALLERGEN MITE: DERMATOPHAGOIDES PTERONYSSINUS (HOUSE DUST MITE) IGE (KU/L): 1.32 KU/L
ALLERGEN TREE: BOX-ELDER (ACER NEGUNDO) IGE (KU/L): <0.1 KU/L
ALLERGEN TREE: COMMON SILVER BIRCH (BETULA VERRUCOSA) IGE (KU/L): <0.1 KU/L
ALLERGEN TREE: COTTONWOOD (POPULUS DELTOIDES) IGE (KU/L): <0.1 KU/L
ALLERGEN TREE: ELM (ULMUS AMERICANA) IGE (KU/L): 0.25 KU/L
ALLERGEN TREE: MAPLE LEAF SYCAMORE, LONDON PLANE IGE (KU/L): <0.1 KU/L
ALLERGEN TREE: MOUNTAIN JUNIPER (JUNIPERUS SABINOIDES) IGE (KU/L): <0.1 KU/L
ALLERGEN TREE: MULBERRY (MORUS ALBA) IGE (KU/L): <0.1 KU/L
ALLERGEN TREE: OAK (QUERCUS ALBA) IGE (KU/L): <0.1 KU/L
ALLERGEN TREE: PECAN, HICKORY (CARYA PECAN) IGE (KU/L): 0.1 KU/L
ALLERGEN TREE: WALNUT IGE: 0.17 KU/L
ALLERGEN TREE: WHITE ASH (FRAXINUS AMERICANA) IGE (KU/L): 0.71 KU/L
ALLERGEN WEED: COMMON PIGWEED (AMARANTHUS RETROFLEXUS) IGE (KU/L): <0.1 KU/L
ALLERGEN WEED: COMMON RAGWEED (AMB. ARTEMISIIFOLIA/A. ELATIOR) IGE (KU/L): 1.41 KU/L
ALLERGEN WEED: GOOSEFOOT, LAMB'S QUARTERS (CHENOPODIUM ALBUM) IGE (KU/L): <0.1 KU/L
ALLERGEN WEED: PLANTAIN (ENGLISH), RIBWORT (PLANTAGO LANCEOLATA) IGE (KU/L): <0.1 KU/L
ALLERGEN WEED: PRICKLY SALTWORT/RUSSIAN THISTLE (SALSOLA KALI) IGE (KU/L): 0.36 KU/L
ALLERGEN WEED: SHEEP SORREL (RUMEX ACETOSELLA) IGE (KU/L): <0.1 KU/L
IGA (MG/DL) IN SER/PLAS: 166 MG/DL (ref 70–400)
IGG (MG/DL) IN SER/PLAS: 772 MG/DL (ref 700–1600)
IGM (MG/DL) IN SER/PLAS: 71 MG/DL (ref 40–230)
IMMUNOCAP IGE: 365 KU/L (ref 0–214)
IMMUNOCAP INTERPRETATION: ABNORMAL

## 2023-08-21 LAB
ALLERGEN ANIMAL: MOUSE EPITHELIUM IGE (KU/L): 73.3 KU/L
COMPLEMENT TOTAL (CH50): 55 U/ML (ref 38.7–89.9)
IMMUNOCAP INTERPRETATION (ARUP): NORMAL
Lab: 23.6 KU/L
PNEUMO SEROTYPE INTERPRETATION: NORMAL
SEROTYPE 1, VIRC: 0.25 UG/ML
SEROTYPE 10A(34), VIRC: 2.75 UG/ML
SEROTYPE 11A(43), VIRC: 0.21 UG/ML
SEROTYPE 12F, VIRC: 0.42 UG/ML
SEROTYPE 14, VIRC: 0.43 UG/ML
SEROTYPE 15B(54), VIRC: 0.21 UG/ML
SEROTYPE 17F, VIRC: 3.5 UG/ML
SEROTYPE 18C(56), VIRC: 0.41 UG/ML
SEROTYPE 19A(57), VIRC: 5.62 UG/ML
SEROTYPE 19F, VIRC: 1.32 UG/ML
SEROTYPE 2, VIRC: 9.3 UG/ML
SEROTYPE 20, VIRC: 1.05 UG/ML
SEROTYPE 22F, VIRC: 0.68 UG/ML
SEROTYPE 23F, VIRC: 0.55 UG/ML
SEROTYPE 3, VIRC: 0.1 UG/ML
SEROTYPE 33F(70), VIRC: 0.16 UG/ML
SEROTYPE 4, VIRC: 0.19 UG/ML
SEROTYPE 5, VIRC: 0.6 UG/ML
SEROTYPE 6B(26), VIRC: 0.24 UG/ML
SEROTYPE 7F(51), VIRC: 0.38 UG/ML
SEROTYPE 8, VIRC: 0.37 UG/ML
SEROTYPE 9N, VIRC: 0.4 UG/ML
SEROTYPE 9V(68), VIRC: 1.32 UG/ML

## 2023-08-30 ENCOUNTER — APPOINTMENT (OUTPATIENT)
Dept: PRIMARY CARE | Facility: CLINIC | Age: 58
End: 2023-08-30
Payer: OTHER GOVERNMENT

## 2023-08-30 ENCOUNTER — TELEMEDICINE (OUTPATIENT)
Dept: PRIMARY CARE | Facility: CLINIC | Age: 58
End: 2023-08-30
Payer: OTHER GOVERNMENT

## 2023-08-30 DIAGNOSIS — J30.1 SEASONAL ALLERGIC RHINITIS DUE TO POLLEN: Primary | ICD-10-CM

## 2023-08-30 DIAGNOSIS — J45.40 MODERATE PERSISTENT ASTHMA WITHOUT COMPLICATION (HHS-HCC): ICD-10-CM

## 2023-08-30 DIAGNOSIS — F41.9 ANXIETY: ICD-10-CM

## 2023-08-30 DIAGNOSIS — E55.9 VITAMIN D DEFICIENCY: ICD-10-CM

## 2023-08-30 PROBLEM — J10.1 INFLUENZA A: Status: RESOLVED | Noted: 2023-08-30 | Resolved: 2023-08-30

## 2023-08-30 PROBLEM — H81.09 ENDOLYMPHATIC HYDROPS: Status: ACTIVE | Noted: 2023-08-30

## 2023-08-30 PROBLEM — G44.009 HEADACHES, CLUSTER: Status: ACTIVE | Noted: 2023-08-30

## 2023-08-30 PROBLEM — R68.82 LIBIDO, DECREASED: Status: ACTIVE | Noted: 2023-08-30

## 2023-08-30 PROBLEM — M65.4 TENOSYNOVITIS, DE QUERVAIN: Status: ACTIVE | Noted: 2023-08-30

## 2023-08-30 PROBLEM — N95.2 VAGINAL ATROPHY: Status: ACTIVE | Noted: 2023-08-30

## 2023-08-30 PROBLEM — B35.1 ONYCHOMYCOSIS: Status: ACTIVE | Noted: 2023-08-30

## 2023-08-30 PROBLEM — J45.909 ASTHMA (HHS-HCC): Status: ACTIVE | Noted: 2023-08-30

## 2023-08-30 PROBLEM — M67.40 GANGLION CYST: Status: ACTIVE | Noted: 2023-08-30

## 2023-08-30 PROBLEM — L20.9 ATOPIC DERMATITIS: Status: ACTIVE | Noted: 2023-08-30

## 2023-08-30 PROCEDURE — 99214 OFFICE O/P EST MOD 30 MIN: CPT | Performed by: FAMILY MEDICINE

## 2023-08-30 RX ORDER — PSEUDOEPHEDRINE HCL 30 MG
30 TABLET ORAL EVERY 6 HOURS PRN
COMMUNITY
Start: 2021-06-28

## 2023-08-30 RX ORDER — DOCUSATE SODIUM 100 MG/1
100 CAPSULE, LIQUID FILLED ORAL 2 TIMES DAILY
COMMUNITY

## 2023-08-30 NOTE — PROGRESS NOTES
Subjective   Patient ID: Tiffanie Maldonado is a 57 y.o. female who presents for Allergy Testing.    HPI   Pt is following up on allergy test results from 8/17/23. Pt states that she has been sick multiple times over the past 5 months and is concerned about having a compromised immune system. Pt would like to get the pneumonia vaccine but because of her age she needs a doctors approval. Pt would like a prescription sent to her pharmacy for Prevnar.     Review of Systems  12 Systems have been reviewed as follows.  Constitutional: Fever, weight gain, weight loss, appetite change, night sweats, fatigue, chills.  Eyes : blurry, double vision, vision, loss, tearing, redness, pain, sensitivity to light, glaucoma.  Ears, nose, mouth, and throat: Hearing loss, ringing in the ears, ear pain, nasal congestion, nasal drainage, nosebleeds, mouth, throat, irritation tooth problem.  Cardiovascular :chest pain, pressure, heart racing, palpitations, sweating, leg swelling, high or low blood pressure  Pulmonary: Cough, yellow or green sputum, blood and sputum, shortness of breath, wheezing  Gastrointestinal: Nausea, vomiting, diarrhea, constipation, pain, blood in stool, or vomitus, heartburn, difficulty swallowing  Genitourinary: incontinence, abnormal bleeding, abnormal discharge, urinary frequency, urinary hesitancy, pain, impotence sexual problem, infection, urinary retention  Musculoskeletal: Pain, stiffness, joint, redness or warmth, arthritis, back pain, weakness, muscle wasting, sprain or fracture  Neuro: Weight weakness, dizziness, change in voice, change in taste change in vision, change in hearing, loss, or change of sensation, trouble walking, balance problems coordination problems, shaking, speech problem  Endocrine , cold or heat intolerance, blood sugar problem, weight gain or loss missed periods hot flashes, sweats, change in body hair, change in libido, increased thirst, increased urination  Heme/lymph: Swelling,  bleeding, problem anemia, bruising, enlarged lymph nodes  Allergic/immunologic: H. plus nasal drip, watery itchy eyes, nasal drainage, immunosuppressed  The above were reviewed and noted negative except as noted in HPI and Problem List.    Objective   There were no vitals taken for this visit.    Physical Exam  Constitutional: Well developed, well nourished, alert and in no acute distress     Assessment/Plan   Problem List Items Addressed This Visit       Vitamin D deficiency    Relevant Orders    Follow Up In [facility] - PCP - Established    Allergic rhinitis - Primary    Relevant Orders    Follow Up In [facility] - PCP - Established    Anxiety    Relevant Orders    Follow Up In [facility] - PCP - Established    Asthma    Relevant Orders    Follow Up In [facility] - PCP - Established     Continue current medications and therapy for chronic medical conditions    Prevnar-20 due to lower immune status      Will fax a recommendation to pharmacy    Virtual Visit - Audio and Visual Communication Real Time

## 2023-09-02 ENCOUNTER — TELEPHONE (OUTPATIENT)
Dept: PRIMARY CARE | Facility: CLINIC | Age: 58
End: 2023-09-02

## 2023-09-02 NOTE — TELEPHONE ENCOUNTER
Patient of Dr. Machuca    Patient is asking about the prevnar 20 . She wants to know if she can get this sent to her pharmacy. She said her specialist sent a prescription over to our office.

## 2023-09-11 DIAGNOSIS — Z23 ENCOUNTER FOR IMMUNIZATION: ICD-10-CM

## 2023-09-11 RX ORDER — PNEUMOCOCCAL 20-VALENT CONJUGATE VACCINE 2.2; 2.2; 2.2; 2.2; 2.2; 2.2; 2.2; 2.2; 2.2; 2.2; 2.2; 2.2; 2.2; 2.2; 2.2; 2.2; 4.4; 2.2; 2.2; 2.2 UG/.5ML; UG/.5ML; UG/.5ML; UG/.5ML; UG/.5ML; UG/.5ML; UG/.5ML; UG/.5ML; UG/.5ML; UG/.5ML; UG/.5ML; UG/.5ML; UG/.5ML; UG/.5ML; UG/.5ML; UG/.5ML; UG/.5ML; UG/.5ML; UG/.5ML; UG/.5ML
0.5 INJECTION, SUSPENSION INTRAMUSCULAR ONCE
Qty: 0.5 ML | Refills: 0 | Status: SHIPPED | OUTPATIENT
Start: 2023-09-11 | End: 2023-09-11

## 2023-09-14 ENCOUNTER — APPOINTMENT (OUTPATIENT)
Dept: PRIMARY CARE | Facility: CLINIC | Age: 58
End: 2023-09-14
Payer: OTHER GOVERNMENT

## 2023-09-29 PROBLEM — J00 NASOPHARYNGITIS: Status: ACTIVE | Noted: 2023-09-29

## 2023-09-29 PROBLEM — R93.89 ABNORMAL ULTRASOUND: Status: ACTIVE | Noted: 2023-09-29

## 2023-09-29 PROBLEM — M54.2 NECK PAIN: Status: ACTIVE | Noted: 2023-09-29

## 2023-09-29 PROBLEM — N39.0 RECURRENT UTI: Status: ACTIVE | Noted: 2023-09-29

## 2023-09-29 PROBLEM — N95.9 PERIMENOPAUSAL DISORDER: Status: ACTIVE | Noted: 2023-09-29

## 2023-09-29 PROBLEM — M25.473 ANKLE EDEMA: Status: ACTIVE | Noted: 2023-09-29

## 2023-09-29 PROBLEM — F52.0 LACK OF SEXUAL DESIRE: Status: ACTIVE | Noted: 2023-09-29

## 2023-09-29 PROBLEM — M77.8 RIGHT ELBOW TENDONITIS: Status: ACTIVE | Noted: 2023-09-29

## 2023-09-29 PROBLEM — R06.2 WHEEZING: Status: ACTIVE | Noted: 2023-09-29

## 2023-09-29 PROBLEM — Z86.19 HISTORY OF CANDIDIASIS: Status: ACTIVE | Noted: 2023-09-29

## 2023-09-29 PROBLEM — J34.89 NASAL CONGESTION WITH RHINORRHEA: Status: ACTIVE | Noted: 2023-09-29

## 2023-09-29 PROBLEM — R09.81 NASAL CONGESTION WITH RHINORRHEA: Status: ACTIVE | Noted: 2023-09-29

## 2023-09-29 PROBLEM — M79.642 PAIN OF LEFT HAND: Status: ACTIVE | Noted: 2023-09-29

## 2023-09-29 PROBLEM — J10.1 INFLUENZA A: Status: ACTIVE | Noted: 2023-09-29

## 2023-09-29 PROBLEM — M25.532 LEFT WRIST PAIN: Status: ACTIVE | Noted: 2023-09-29

## 2023-09-29 PROBLEM — R22.32 LOCALIZED SWELLING ON LEFT HAND: Status: ACTIVE | Noted: 2023-09-29

## 2023-09-29 PROBLEM — R39.15 URINARY URGENCY: Status: ACTIVE | Noted: 2023-09-29

## 2023-09-29 PROBLEM — J32.9 CHRONIC SINUSITIS: Status: ACTIVE | Noted: 2023-09-29

## 2023-09-29 PROBLEM — B37.9 YEAST INFECTION: Status: ACTIVE | Noted: 2023-09-29

## 2023-09-29 PROBLEM — S93.409A ANKLE SPRAIN: Status: ACTIVE | Noted: 2023-09-29

## 2023-09-29 RX ORDER — CEFDINIR 300 MG/1
600 CAPSULE ORAL DAILY
COMMUNITY
End: 2024-03-05 | Stop reason: ALTCHOICE

## 2023-09-29 RX ORDER — BENZONATATE 200 MG/1
200 CAPSULE ORAL 3 TIMES DAILY PRN
COMMUNITY

## 2023-10-03 ENCOUNTER — OFFICE VISIT (OUTPATIENT)
Dept: OPHTHALMOLOGY | Facility: CLINIC | Age: 58
End: 2023-10-03
Payer: OTHER GOVERNMENT

## 2023-10-03 DIAGNOSIS — H52.223 REGULAR ASTIGMATISM OF BOTH EYES: ICD-10-CM

## 2023-10-03 DIAGNOSIS — H52.13 MYOPIA OF BOTH EYES: ICD-10-CM

## 2023-10-03 DIAGNOSIS — H50.22 HYPERTROPIA OF LEFT EYE: ICD-10-CM

## 2023-10-03 DIAGNOSIS — H35.371 EPIRETINAL MEMBRANE (ERM) OF RIGHT EYE: ICD-10-CM

## 2023-10-03 DIAGNOSIS — H52.4 PRESBYOPIA: ICD-10-CM

## 2023-10-03 DIAGNOSIS — H53.2 DIPLOPIA: Primary | ICD-10-CM

## 2023-10-03 PROCEDURE — 92014 COMPRE OPH EXAM EST PT 1/>: CPT | Performed by: OPHTHALMOLOGY

## 2023-10-03 PROCEDURE — 92134 CPTRZ OPH DX IMG PST SGM RTA: CPT | Mod: BILATERAL PROCEDURE | Performed by: OPHTHALMOLOGY

## 2023-10-03 ASSESSMENT — REFRACTION_MANIFEST
OS_ADD: +2.25
OD_AXIS: 015
OS_SPHERE: -3.75
OS_AXIS: 180
OD_SPHERE: -3.25
OS_CYLINDER: -3.50
OD_CYLINDER: -3.50
OD_ADD: +2.25

## 2023-10-03 ASSESSMENT — REFRACTION_WEARINGRX
OS_SPHERE: -3.75
OS_CYLINDER: -3.75
OD_AXIS: 013
OD_CYLINDER: -3.75
OD_SPHERE: -3.25
OS_CYLINDER: -4.50
OD_ADD: +2.25
OS_AXIS: 180
OD_SPHERE: -3.25
OS_AXIS: 005
OS_SPHERE: -4.50
OS_ADD: +2.25
OD_ADD: +2.00
OD_CYLINDER: -3.25
OS_ADD: +2.00
OD_AXIS: 013

## 2023-10-03 ASSESSMENT — SLIT LAMP EXAM - LIDS
COMMENTS: NORMAL
COMMENTS: NORMAL

## 2023-10-03 ASSESSMENT — PACHYMETRY
OD_CT(UM): 525
OS_CT(UM): 503

## 2023-10-03 ASSESSMENT — TONOMETRY
OS_IOP_MMHG: 14
IOP_METHOD: GOLDMANN APPLANATION
OD_IOP_MMHG: 16

## 2023-10-03 ASSESSMENT — CUP TO DISC RATIO
OD_RATIO: 0.35
OS_RATIO: 0.35

## 2023-10-03 ASSESSMENT — VISUAL ACUITY
OS_SC: 20/25-
METHOD: SNELLEN - LINEAR
OD_SC: 20/20-2

## 2023-10-03 ASSESSMENT — EXTERNAL EXAM - LEFT EYE: OS_EXAM: NORMAL

## 2023-10-03 ASSESSMENT — EXTERNAL EXAM - RIGHT EYE: OD_EXAM: NORMAL
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
## 2023-10-06 ENCOUNTER — APPOINTMENT (OUTPATIENT)
Dept: PHYSICAL THERAPY | Facility: CLINIC | Age: 58
End: 2023-10-06
Payer: OTHER GOVERNMENT

## 2023-10-09 ENCOUNTER — APPOINTMENT (OUTPATIENT)
Dept: OBSTETRICS AND GYNECOLOGY | Facility: CLINIC | Age: 58
End: 2023-10-09
Payer: OTHER GOVERNMENT

## 2023-10-09 VITALS — WEIGHT: 145 LBS | SYSTOLIC BLOOD PRESSURE: 110 MMHG | BODY MASS INDEX: 24.13 KG/M2 | DIASTOLIC BLOOD PRESSURE: 70 MMHG

## 2023-10-09 DIAGNOSIS — R23.2 VASOMOTOR FLUSHING: Primary | ICD-10-CM

## 2023-10-09 PROCEDURE — 99214 OFFICE O/P EST MOD 30 MIN: CPT | Performed by: OBSTETRICS & GYNECOLOGY

## 2023-10-09 PROCEDURE — 1036F TOBACCO NON-USER: CPT | Performed by: OBSTETRICS & GYNECOLOGY

## 2023-10-09 RX ORDER — ESTRADIOL/NORETHINDRONE ACETATE TRANSDERMAL SYSTEM .05; .14 MG/D; MG/D
1 PATCH, EXTENDED RELEASE TRANSDERMAL 2 TIMES WEEKLY
Qty: 24 PATCH | Refills: 3 | Status: SHIPPED | OUTPATIENT
Start: 2023-10-09 | End: 2026-07-13

## 2023-10-09 NOTE — PROGRESS NOTES
GYN PROGRESS NOTE          CC:     Chief Complaint   Patient presents with    Menopause     Est pt here for hormone changes the interosa helps some she is also using estradiol if she is not using both its not happy still having some discomfort. she's having increased hot flashes, states she more crabby.  Weight gain.  Talking about bio-identical    Chaperone student       HPI:  Patient answers are not available for this visit.  HPI       Menopause     Additional comments: Est pt here for hormone changes the interosa helps some she is also using estradiol if she is not using both its not happy still having some discomfort. she's having increased hot flashes, states she more crabby.  Weight gain.  Talking about bio-identical    Chaperone student          Last edited by Hanane Love MA on 10/9/2023  3:49 PM.      Hot flashes HRT counseling performed reviewed options discussed that she wants bioidentical hormones discussed the risk of off label use of medications and the availability of on label medications    Recently was on testosterone trial is inconsistent with use currently using Intrarosa every other day discussed discontinuing both of these things      ROS:  GEN - no fevers or chills  RESP - no SOB or cough  GYN - see HPI      HISTORY:  Past Medical History:   Diagnosis Date    Influenza A 08/30/2023    Personal history of cervical dysplasia 09/08/2015    History of cervical dysplasia    Personal history of other diseases of the digestive system 09/08/2015    History of diverticulitis of colon     Past Surgical History:   Procedure Laterality Date    CERVICAL BIOPSY  W/ LOOP ELECTRODE EXCISION  09/08/2015    Cervical Loop Electrosurgical Excision (LEEP)    OTHER SURGICAL HISTORY  11/18/2021    Wrist surgery     Social History     Socioeconomic History    Marital status:      Spouse name: Not on file    Number of children: Not on file    Years of education: Not on file    Highest education level: Not on  file   Occupational History    Not on file   Tobacco Use    Smoking status: Never    Smokeless tobacco: Never   Vaping Use    Vaping Use: Never used   Substance and Sexual Activity    Alcohol use: Never    Drug use: Never    Sexual activity: Defer   Other Topics Concern    Not on file   Social History Narrative    Not on file     Social Determinants of Health     Financial Resource Strain: Not on file   Food Insecurity: Not on file   Transportation Needs: Not on file   Physical Activity: Not on file   Stress: Not on file   Social Connections: Not on file   Intimate Partner Violence: Not on file   Housing Stability: Not on file     Cancer-related family history is not on file.       PHYSICAL EXAM:  /70   Wt 65.8 kg (145 lb)   BMI 24.13 kg/m²   GEN:  A&O, NAD  HEENT:  head HC/AT, no visible goiter  PSYCH:  normal affect, anxious      IMPRESSION/PLAN:      CombiPatch estradiol  Start Intrarosa  Stop testosterone  Follow-up for Pap smear declined today      Colton Lay MD

## 2023-10-09 NOTE — PROGRESS NOTES
GYN PROGRESS NOTE              CC:          Chief Complaint   Patient presents with    Menopause       Est pt here for hormone changes the interosa helps some she is also using estradiol if she is not using both its not happy still having some discomfort. she's having increased hot flashes, states she more crabby.  Weight gain.  Talking about bio-identical    Chaperone student         HPI:  Patient answers are not available for this visit.  HPI     Tiffanie Maldonado is a 57 y.o her today for hormone changes due to menopause.      Menopause     Additional comments: Est pt here for hormone changes the interosa helps some she is also using estradiol if she is not using both its not happy still having some discomfort. she's having increased hot flashes, states she more crabby.  Weight gain.  Talking about bio-identical    Chaperone student            Last edited by Hanane Love MA on 10/9/2023  3:49 PM.                ROS:  GEN - no fevers or chills  RESP - no SOB or cough  GYN - see HPI        HISTORY:  Medical History        Past Medical History:   Diagnosis Date    Influenza A 08/30/2023    Personal history of cervical dysplasia 09/08/2015     History of cervical dysplasia    Personal history of other diseases of the digestive system 09/08/2015     History of diverticulitis of colon         Surgical History         Past Surgical History:   Procedure Laterality Date    CERVICAL BIOPSY  W/ LOOP ELECTRODE EXCISION   09/08/2015     Cervical Loop Electrosurgical Excision (LEEP)    OTHER SURGICAL HISTORY   11/18/2021     Wrist surgery         Social History               Socioeconomic History    Marital status:        Spouse name: Not on file    Number of children: Not on file    Years of education: Not on file    Highest education level: Not on file   Occupational History    Not on file   Tobacco Use    Smoking status: Never    Smokeless tobacco: Never   Vaping Use    Vaping Use: Never used   Substance and Sexual  Activity    Alcohol use: Never    Drug use: Never    Sexual activity: Defer   Other Topics Concern    Not on file   Social History Narrative    Not on file      Social Determinants of Health      Financial Resource Strain: Not on file   Food Insecurity: Not on file   Transportation Needs: Not on file   Physical Activity: Not on file   Stress: Not on file   Social Connections: Not on file   Intimate Partner Violence: Not on file   Housing Stability: Not on file         Cancer-related family history is not on file.         PHYSICAL EXAM:  /70   Wt 65.8 kg (145 lb)   BMI 24.13 kg/m²   GEN:  A&O, NAD  HEENT:  head HC/AT, no visible goiter  PSYCH:  normal affect, non-anxious        IMPRESSION/PLAN:  57 y.o. being assessed for Vasomotor flushing.     CombiPatch estradiol  Start Intrarosa  Stop testosterone  Follow-up for Pap smear declined today      Follow up as scheduled    Phyllis KAPLAN am scribing for virtually, and in the presence of Dr. Colton Lay on  10/09/23 at 4:43 PM     Agree with above, EUSEBIO Momin personally performed the services described in the documentation which was scribed virtually confirm is both complete and accurate. DS     Colton Lay MD

## 2023-10-10 ENCOUNTER — TREATMENT (OUTPATIENT)
Dept: PHYSICAL THERAPY | Facility: CLINIC | Age: 58
End: 2023-10-10
Payer: OTHER GOVERNMENT

## 2023-10-10 ENCOUNTER — LAB (OUTPATIENT)
Dept: LAB | Facility: LAB | Age: 58
End: 2023-10-10
Payer: OTHER GOVERNMENT

## 2023-10-10 DIAGNOSIS — M54.12 CERVICAL RADICULOPATHY, CHRONIC: ICD-10-CM

## 2023-10-10 DIAGNOSIS — R53.83 OTHER FATIGUE: ICD-10-CM

## 2023-10-10 DIAGNOSIS — M54.2 NECK PAIN: Primary | ICD-10-CM

## 2023-10-10 PROCEDURE — 84439 ASSAY OF FREE THYROXINE: CPT

## 2023-10-10 PROCEDURE — 97110 THERAPEUTIC EXERCISES: CPT | Mod: GP | Performed by: PHYSICAL THERAPIST

## 2023-10-10 PROCEDURE — 80053 COMPREHEN METABOLIC PANEL: CPT

## 2023-10-10 PROCEDURE — 80061 LIPID PANEL: CPT

## 2023-10-10 PROCEDURE — 85027 COMPLETE CBC AUTOMATED: CPT

## 2023-10-10 PROCEDURE — 84436 ASSAY OF TOTAL THYROXINE: CPT

## 2023-10-10 PROCEDURE — 97140 MANUAL THERAPY 1/> REGIONS: CPT | Mod: GP | Performed by: PHYSICAL THERAPIST

## 2023-10-10 PROCEDURE — 84443 ASSAY THYROID STIM HORMONE: CPT

## 2023-10-10 PROCEDURE — 36415 COLL VENOUS BLD VENIPUNCTURE: CPT

## 2023-10-10 ASSESSMENT — PAIN - FUNCTIONAL ASSESSMENT: PAIN_FUNCTIONAL_ASSESSMENT: 0-10

## 2023-10-10 ASSESSMENT — PAIN SCALES - GENERAL: PAINLEVEL_OUTOF10: 2

## 2023-10-10 NOTE — PROGRESS NOTES
Physical Therapy    Physical Therapy Treatment    Patient Name: Tiffanie Maldonado  MRN: [id number]  Today's Date: 10/10/2023  Time Calculation  Start Time: 1245  Stop Time: 1327  Time Calculation (min): 42 min      Current Problem  1. Neck pain        2. Cervical radiculopathy, chronic  PT eval and treat          Subjective :     Precautions  Precautions  STEADI Fall Risk Score (The score of 4 or more indicates an increased risk of falling): 0  Precautions Comment: None    Pain  Pain Assessment: 0-10  Pain Score: 2  Pain Location: Neck  Pain Radiating Towards: shoulders    Patient reports that she is being more conscious of her posture and ensuring her shoulders aren't shrugged all the time. States that she has seen improvements in her neck pain.      Therapeutic exercise (39705):   Stepper 5 min c/ MHP to cervical spine   Pulleys 3 min with ipsilateral rotation  UTS 2x30 seconds (B) HELD time   LSS 2x30 seconds (B) HELD time   Seated thor ext over half roll 5/5, 2 min   SAPD Blue, 2x10 (p, resistance)   Rows [place], 2x10 (p, resistance)   ER Green, 2x10 (B)  IR Green, 2x10 (B)   Anti-rotation press GTB 2 min   Reassess OBJ measures NV.     Manual Therapy (64543):    IASTM to (B) UT, (B) LS.    AAROM C/S SB and ROT in sitting NEW     Objective   No objective measures assessed this visit     Assessment:   Able to progress interventions this date d/t improving strength; however, deficits remain impacting patient's ability to maintain proper posture and mechanics of movement contributing to ongoing neck pain and soreness.     Pt is progressing as expected towards goals  This session exercises were progressed (p) or added (NEW) as documented in treatment section.  Pt required minimal cues and demonstration to complete exercises with proper form and responded without adverse effects.  Pt continues to lack strength to support and stabilize shoulder girdle and soft tissue mobility necessary for the completion of baseline  ADLs   Pt will benefit from further therapy to continue to progress towards meeting functional and impairment goals.    Plan:     Continue to progress treatment to address strength, motion, jt mobility, soft tissue mobility, and flexibility deficits impacting patient's return to PLOF unrestricted and symptom free.     Goals:   Goals established in legacy AEMR.

## 2023-10-11 LAB
ALBUMIN SERPL BCP-MCNC: 4.2 G/DL (ref 3.4–5)
ALP SERPL-CCNC: 70 U/L (ref 33–110)
ALT SERPL W P-5'-P-CCNC: 15 U/L (ref 7–45)
ANION GAP SERPL CALC-SCNC: 11 MMOL/L (ref 10–20)
AST SERPL W P-5'-P-CCNC: 16 U/L (ref 9–39)
BILIRUB SERPL-MCNC: 0.4 MG/DL (ref 0–1.2)
BUN SERPL-MCNC: 18 MG/DL (ref 6–23)
CALCIUM SERPL-MCNC: 9.3 MG/DL (ref 8.6–10.6)
CHLORIDE SERPL-SCNC: 105 MMOL/L (ref 98–107)
CHOLEST SERPL-MCNC: 206 MG/DL (ref 0–199)
CHOLESTEROL/HDL RATIO: 3.5
CO2 SERPL-SCNC: 27 MMOL/L (ref 21–32)
CREAT SERPL-MCNC: 0.7 MG/DL (ref 0.5–1.05)
ERYTHROCYTE [DISTWIDTH] IN BLOOD BY AUTOMATED COUNT: 13.1 % (ref 11.5–14.5)
GFR SERPL CREATININE-BSD FRML MDRD: >90 ML/MIN/1.73M*2
GLUCOSE SERPL-MCNC: 86 MG/DL (ref 74–99)
HCT VFR BLD AUTO: 40.2 % (ref 36–46)
HDLC SERPL-MCNC: 58.1 MG/DL
HGB BLD-MCNC: 12.8 G/DL (ref 12–16)
LDLC SERPL CALC-MCNC: 129 MG/DL (ref 140–190)
MCH RBC QN AUTO: 30.1 PG (ref 26–34)
MCHC RBC AUTO-ENTMCNC: 31.8 G/DL (ref 32–36)
MCV RBC AUTO: 95 FL (ref 80–100)
NON HDL CHOLESTEROL: 148 MG/DL (ref 0–149)
NRBC BLD-RTO: 0 /100 WBCS (ref 0–0)
PLATELET # BLD AUTO: 243 X10*3/UL (ref 150–450)
PMV BLD AUTO: 11.2 FL (ref 7.5–11.5)
POTASSIUM SERPL-SCNC: 4.1 MMOL/L (ref 3.5–5.3)
PROT SERPL-MCNC: 6.6 G/DL (ref 6.4–8.2)
RBC # BLD AUTO: 4.25 X10*6/UL (ref 4–5.2)
SODIUM SERPL-SCNC: 139 MMOL/L (ref 136–145)
T4 FREE SERPL-MCNC: 1.2 NG/DL (ref 0.78–1.48)
T4 SERPL-MCNC: 7.8 UG/DL (ref 4.5–11.1)
TRIGL SERPL-MCNC: 93 MG/DL (ref 0–149)
TSH SERPL-ACNC: 2.19 MIU/L (ref 0.44–3.98)
VLDL: 19 MG/DL (ref 0–40)
WBC # BLD AUTO: 6.4 X10*3/UL (ref 4.4–11.3)

## 2023-10-13 ENCOUNTER — TREATMENT (OUTPATIENT)
Dept: PHYSICAL THERAPY | Facility: CLINIC | Age: 58
End: 2023-10-13
Payer: OTHER GOVERNMENT

## 2023-10-13 DIAGNOSIS — M54.12 CERVICAL RADICULOPATHY, CHRONIC: Primary | ICD-10-CM

## 2023-10-13 PROCEDURE — 97110 THERAPEUTIC EXERCISES: CPT | Mod: GP | Performed by: PHYSICAL THERAPIST

## 2023-10-13 ASSESSMENT — PAIN - FUNCTIONAL ASSESSMENT: PAIN_FUNCTIONAL_ASSESSMENT: 0-10

## 2023-10-13 ASSESSMENT — PAIN SCALES - GENERAL: PAINLEVEL_OUTOF10: 2

## 2023-10-13 NOTE — PROGRESS NOTES
Physical Therapy    Physical Therapy Re-assessment    Patient Name: Tiffanie Maldonado  MRN: [id number]  Today's Date: 10/13/2023  Time Calculation  Start Time: 1417  Stop Time: 1501  Time Calculation (min): 44 min      Current Problem  1. Cervical radiculopathy, chronic  PT eval and treat          Subjective :     Precautions  Precautions  Precautions Comment: None    Pain  Pain Assessment: 0-10  Pain Score: 2    Patient reports that she is being more conscious of her posture and ensuring her shoulders aren't shrugged all the time. States that she has seen improvements in her neck pain. Still feels very tight.       Objective   OBJECTIVE MEASURES TAKEN 10/13/23  Upper Extremity Strength:  Date: eval RIGHT LEFT   Shoulder Flexion     Shoulder Abduction     Shoulder ER     Shoulder IR     Elbow Flexion     Elbow Extension     Wrist Flexion     Wrist Extension       Cervical ROM:  Date: eval Percentage    Flexion 40    Extension 60     RIGHT LEFT   Side bend 35 25   Rotation 50 50       Assessment:  Pt has made some progress towards goals.  She notes being more conscious of her posture but still feeling tight every day especially at the end of the day.  Goals are mostly met.      This session exercises were  reviewed in order to improve PT understanding. Pt required minimal cues and demonstration to complete exercises with proper form and responded without adverse effects.  Pt continues to lack strength to support and stabilize shoulder girdle and soft tissue mobility necessary for the completion of baseline ADLs   Pt will benefit from further therapy to continue to progress towards meeting functional and impairment goals.    Plan:     Discharge next visit    Therapeutic exercise ([id number]):   Stepper 5 min c/ MHP to cervical spine   Pulleys 3 min with ipsilateral rotation  UTS 2x30 seconds (B) HELD time   LSS 2x30 seconds (B) HELD time   Seated thor ext over half roll 5/5, 2 min   SAPD Blue, 2x10 (p, resistance)   Rows  Grey, 2x10 (p, resistance)   ER Green, 2x10 (B)  IR Green, 2x10 (B)   Anti-rotation press GTB 2 min     Manual Therapy ([id number]): HELD TODAY TIME   IASTM to (B) UT, (B) LS.    AAROM C/S SB and ROT in sitting NEW     **NV discharge to ind HEP    Goals:  . Goals: Goals set and discussed today.      1. pain no higher than 2/10 NOT PROGRESSING  2. cervical rot AROM to at least 50 deg MET  3. UE strength to at least 4/5 MET  4. pt will be able to drive for 2 hours without increased pain  MET  5. NDI to 4%

## 2023-10-16 ENCOUNTER — APPOINTMENT (OUTPATIENT)
Dept: PHYSICAL THERAPY | Facility: CLINIC | Age: 58
End: 2023-10-16
Payer: OTHER GOVERNMENT

## 2023-10-20 ENCOUNTER — TREATMENT (OUTPATIENT)
Dept: PHYSICAL THERAPY | Facility: CLINIC | Age: 58
End: 2023-10-20
Payer: OTHER GOVERNMENT

## 2023-10-20 DIAGNOSIS — M54.12 CERVICAL RADICULOPATHY, CHRONIC: ICD-10-CM

## 2023-10-20 PROCEDURE — 97110 THERAPEUTIC EXERCISES: CPT | Mod: GP | Performed by: PHYSICAL THERAPIST

## 2023-10-20 ASSESSMENT — PAIN DESCRIPTION - DESCRIPTORS: DESCRIPTORS: TIGHTNESS

## 2023-10-20 ASSESSMENT — PAIN - FUNCTIONAL ASSESSMENT: PAIN_FUNCTIONAL_ASSESSMENT: 0-10

## 2023-10-20 ASSESSMENT — PAIN SCALES - GENERAL: PAINLEVEL_OUTOF10: 3

## 2023-10-20 NOTE — PROGRESS NOTES
Physical Therapy    Physical Therapy Treatment/Discharge    Patient Name: Tiffanie Maldonado  MRN: [id number]  Today's Date: 10/20/2023  Time Calculation  Start Time: 1321  Stop Time: 1405  Time Calculation (min): 44 min      Current Problem  1. Cervical radiculopathy, chronic  PT eval and treat          Subjective :     Precautions  Precautions  Precautions Comment: None    Pain  Pain Assessment: 0-10  Pain Score: 3  Pain Location: Neck  Pain Descriptors: Tightness    Patient reports she continues to have tightness in her neck but attributes this to pillow positioning while sleeping. Reports she feels comfortable to continue with her exercises at home.     Therapeutic exercise ([id number]):   Stepper 5 min  Pulleys 3 min with ipsilateral rotation  UTS 2x30 seconds (B)   LSS 2x30 seconds (B)  Seated thor ext over half roll 5/5, 2 min   SAPD Blue, 2x10   Rows Grey, 2x10   ER/IR Blue, 2x10 (B)   Anti-rotation press Blue, 2 min (B)    HEP completed, updated, provided, and reviewed    Access Code: [email or web address]  URL: [email or web address]  Date: 10/20/2023  Prepared by: Jennifer Dumas    Program Notes  Place heat and/or ice to your neck for 10-15 minutes as needed to aid in reducing stiffness.     Exercises  - Seated Upper Trapezius Stretch  - 1 x daily - 7 x weekly - 3 sets - 1 reps - 30 seconds  hold  - Seated Levator Scapulae Stretch  - 1 x daily - 7 x weekly - 3 sets - 1 reps - 30 seconds  hold  - Seated Thoracic Lumbar Extension with Pectoralis Stretch  - 1 x daily - 7 x weekly - 3 sets - 10 reps - 5 seconds  hold  - Shoulder extension with resistance - Neutral  - 1 x daily - 7 x weekly - 3 sets - 10 reps  - Standing Shoulder Row with Anchored Resistance  - 1 x daily - 7 x weekly - 3 sets - 10 reps  - Shoulder External Rotation with Anchored Resistance  - 1 x daily - 7 x weekly - 3 sets - 10 reps  - Shoulder Internal Rotation with Resistance  - 1 x daily - 7 x weekly - 3 sets - 10 reps  - Standing  Anti-Rotation Press with Anchored Resistance  - 1 x daily - 7 x weekly - 3 sets - 10 reps - 5 seconds  hold    Patient Education  - Heat  - Ice    Manual Therapy ([id number]):    IASTM to (B) UT, (B) LS.    AAROM C/S SB and ROT in sitting        Objective   No objective measures assessed this visit  - OBJ measures updated last visit.     Outcome Measures:  Other Measures  Neck Disability Index: 14%     Assessment:   Patient is appropriate for d/c from PT at this time. Patient has met goals for PT. Patient has reached max benefit from skilled PT care. They are independent and safe with HEP to continue with self-care of condition at home with HEP.     Plan:   No further skilled care.     Goals:   . Goals: Goals set and discussed today.      1. pain no higher than 2/10 NOT PROGRESSING  2. cervical rot AROM to at least 50 deg MET  3. UE strength to at least 4/5 MET  4. pt will be able to drive for 2 hours without increased pain  MET  5. NDI to 4%       Physical Therapy    Discharge Summary    Name: Tiffanie Maldonado  MRN: [id number]  : 1965  Date: 10/20/23    Discharge Summary: PT    Discharge Information: Date of discharge 10/20/23, Date of last visit 10/20/23, Date of evaluation 23, Number of attended visits 9, Referred by Dr. Ag Hart, and Referred for Neck pain    Therapy Summary: addressed strength, motion, and tissue mobility deficits contributing to pain, stiffness, and postural control deficits.     Discharge Status: met majority of goals, plateau in progress, ind with HEP to continue to manage independently.      Rehab Discharge Reason: Achieved all and/or the most significant goals(s) and Progress plateaued; further improvement possible

## 2023-10-25 ENCOUNTER — OFFICE VISIT (OUTPATIENT)
Dept: PRIMARY CARE | Facility: CLINIC | Age: 58
End: 2023-10-25
Payer: OTHER GOVERNMENT

## 2023-10-25 VITALS
HEIGHT: 64 IN | BODY MASS INDEX: 24.07 KG/M2 | OXYGEN SATURATION: 96 % | SYSTOLIC BLOOD PRESSURE: 102 MMHG | WEIGHT: 141 LBS | HEART RATE: 77 BPM | RESPIRATION RATE: 16 BRPM | DIASTOLIC BLOOD PRESSURE: 64 MMHG

## 2023-10-25 DIAGNOSIS — Z12.31 ENCOUNTER FOR SCREENING MAMMOGRAM FOR MALIGNANT NEOPLASM OF BREAST: ICD-10-CM

## 2023-10-25 DIAGNOSIS — B35.1 ONYCHOMYCOSIS: Primary | ICD-10-CM

## 2023-10-25 DIAGNOSIS — H53.2 DIPLOPIA: ICD-10-CM

## 2023-10-25 DIAGNOSIS — L65.9 ALOPECIA: ICD-10-CM

## 2023-10-25 PROCEDURE — 99214 OFFICE O/P EST MOD 30 MIN: CPT | Performed by: FAMILY MEDICINE

## 2023-10-25 ASSESSMENT — ENCOUNTER SYMPTOMS
DEPRESSION: 0
OCCASIONAL FEELINGS OF UNSTEADINESS: 0
LOSS OF SENSATION IN FEET: 0

## 2023-10-25 ASSESSMENT — PAIN SCALES - GENERAL: PAINLEVEL: 0-NO PAIN

## 2023-10-25 NOTE — PROGRESS NOTES
Subjective   Patient ID: Tiffanie Maldonado is a 57 y.o. female who presents for Nail Problem.    HPI   Pt presents today for follow up on onychomycosis, seen for on 8/1/23. Pt was prescribed Penlac solution and Lamisil. Pt states toe nail looks a little better but has not been constant with applying solution. Pt has been taking Lamisil.     Pt states she discontinued testosterone cream.    Pt had labs and cologuard on 10/10/2023. Reviewed today.    Pt seen Dr. Kemp, ophthalmologist, who advised her to see a neurophthalmology. Pt is seeing them on 10/26/23.     Review of Systems  12 Systems have been reviewed as follows.  Constitutional: Fever, weight gain, weight loss, appetite change, night sweats, fatigue, chills.  Eyes : blurry, double vision, vision, loss, tearing, redness, pain, sensitivity to light, glaucoma.  Ears: nose, mouth, and throat: Hearing loss, ringing in the ears, ear pain, nasal congestion, nasal drainage, nosebleeds, mouth, throat, irritation tooth problem.  Cardiovascular: chest pain, pressure, heart racing, palpitations, sweating, leg swelling, high or low blood pressure  Pulmonary: Cough, yellow or green sputum, blood and sputum, shortness of breath, wheezing  Gastrointestinal: Nausea, vomiting, diarrhea, constipation, pain, blood in stool, or vomitus, heartburn, difficulty swallowing  Genitourinary: incontinence, abnormal bleeding, abnormal discharge, urinary frequency, urinary hesitancy, pain, impotence sexual problem, infection, urinary retention  Musculoskeletal: Pain, stiffness, joint, redness or warmth, arthritis, back pain, weakness, muscle wasting, sprain or fracture  Neuro: Weight weakness, dizziness, change in voice, change in taste change in vision, change in hearing, loss, or change of sensation, trouble walking, balance problems coordination problems, shaking, speech problem  Endocrine: cold or heat intolerance, blood sugar problem, weight gain or loss missed periods hot  "flashes, sweats, change in body hair, change in libido, increased thirst, increased urination  Heme/lymph: Swelling, bleeding, problem anemia, bruising, enlarged lymph nodes  Allergic/immunologic: H. plus nasal drip, watery itchy eyes, nasal drainage, immunosuppressed  The above were reviewed and noted negative except as noted in HPI and Problem List.    Objective   /64 (BP Location: Right arm, Patient Position: Sitting, BP Cuff Size: Adult)   Pulse 77   Resp 16   Ht 1.626 m (5' 4\")   Wt 64 kg (141 lb)   SpO2 96%   BMI 24.20 kg/m²     Physical Exam  CONSTITUTIONAL- NAD, Pt is A & O x3, Vital signs reviewed per chart.  General Appearance- normal , good hygiene,talks easily  EYES-PERRLA conjunctiva and lids- normal  EARS/NOSE-TM's normal, head normocephalic and atraumatic, naso pharynx-no nasal discharge, no trismus,  oropharynx- normal without exudate  NECK- supple, FROM, without mass  thyroid- NT, normal size, no nodule noted  LYMPH- WNL  CV- RRR without murmur, gallop, or other abnormality.  PULM- CTA bilaterally  Respiratory effort- normal respiratory effort , no retractions or nasal flaring  ABDOMEN- normoactive BS's , soft , NT, no hepatosplenomegaly palpated, or masses. No pulsatile masses noted  extremities no edema,NT  SKIN- no abnormal skin lesions on inspection or palpation  neuro- no focal deficits  PSYCH- pleasant, normal judgement and insight    Assessment/Plan   Problem List Items Addressed This Visit             ICD-10-CM    Onychomycosis - Primary B35.1    Diplopia H53.2     Other Visit Diagnoses         Codes    Encounter for screening mammogram for malignant neoplasm of breast     Z12.31    Relevant Orders    BI US breast complete bilateral        Rec'd Rogain use.  Scribe Attestation  By signing my name below, IEnid RMA, Scribe   attest that this documentation has been prepared under the direction and in the presence of Ag Hart DO.    Provider Attestation - Scribe " documentation    All medical record entries made by the Scribe were at my direction and personally dictated by me. I have reviewed the chart and agree that the record accurately reflects my personal performance of the history, physical exam, discussion and plan.

## 2023-11-06 ENCOUNTER — OFFICE VISIT (OUTPATIENT)
Dept: OPHTHALMOLOGY | Facility: CLINIC | Age: 58
End: 2023-11-06
Payer: OTHER GOVERNMENT

## 2023-11-06 DIAGNOSIS — H53.2 DIPLOPIA: Primary | ICD-10-CM

## 2023-11-06 PROCEDURE — 99215 OFFICE O/P EST HI 40 MIN: CPT | Performed by: PSYCHIATRY & NEUROLOGY

## 2023-11-06 PROCEDURE — 92060 SENSORIMOTOR EXAMINATION: CPT | Performed by: PSYCHIATRY & NEUROLOGY

## 2023-11-06 ASSESSMENT — CONF VISUAL FIELD
OS_NORMAL: 1
OD_SUPERIOR_TEMPORAL_RESTRICTION: 0
OD_NORMAL: 1
OD_INFERIOR_NASAL_RESTRICTION: 0
OS_SUPERIOR_TEMPORAL_RESTRICTION: 0
OS_INFERIOR_TEMPORAL_RESTRICTION: 0
OS_SUPERIOR_NASAL_RESTRICTION: 0
OS_INFERIOR_NASAL_RESTRICTION: 0
OD_INFERIOR_TEMPORAL_RESTRICTION: 0
METHOD: COUNTING FINGERS
OD_SUPERIOR_NASAL_RESTRICTION: 0

## 2023-11-06 ASSESSMENT — ENCOUNTER SYMPTOMS
NEUROLOGICAL NEGATIVE: 0
CONSTITUTIONAL NEGATIVE: 0
CARDIOVASCULAR NEGATIVE: 0
ALLERGIC/IMMUNOLOGIC NEGATIVE: 0
GASTROINTESTINAL NEGATIVE: 0
EYES NEGATIVE: 1
MUSCULOSKELETAL NEGATIVE: 0
HEMATOLOGIC/LYMPHATIC NEGATIVE: 0
RESPIRATORY NEGATIVE: 0
PSYCHIATRIC NEGATIVE: 0
ENDOCRINE NEGATIVE: 0

## 2023-11-06 ASSESSMENT — VISUAL ACUITY
METHOD: SNELLEN - LINEAR
OD_CC: 20/20-2
OS_CC: 20/20-3

## 2023-11-06 ASSESSMENT — PACHYMETRY
OD_CT(UM): 525
OS_CT(UM): 503

## 2023-11-06 ASSESSMENT — EXTERNAL EXAM - LEFT EYE: OS_EXAM: NORMAL

## 2023-11-06 ASSESSMENT — SLIT LAMP EXAM - LIDS
COMMENTS: NORMAL
COMMENTS: NORMAL

## 2023-11-06 ASSESSMENT — EXTERNAL EXAM - RIGHT EYE: OD_EXAM: NORMAL

## 2023-11-06 ASSESSMENT — CUP TO DISC RATIO
OS_RATIO: 0.35
OD_RATIO: 0.35

## 2023-11-06 ASSESSMENT — TONOMETRY
IOP_METHOD: TONOPEN
OS_IOP_MMHG: 15
OD_IOP_MMHG: 18

## 2023-11-06 NOTE — PROGRESS NOTES
Assessment and Plan    10/03/2023 OCT macula OD normal foveal contour 306 & OS normal foveal contour 200.    This 58 year-old woman with a history of strabismus surgery 1972, IBS, acne, asthma presents for evaluation of ocular misalignment.    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 She chance of a new or different problem, such as a mass lesion affecting cranial nerve (CN) IV is small, but worth consideration. I doubt ocular myasthenia or thyroid eye disease without more signs and with such consistency from Dr. Edouard to now. We discussed whether to pursue imaging versus a combination of Fresnel for now with possible permanent prism and/or further strabismus surgery in the future.    Plan    Fresnel for now.  Check MRI brain & orbits with contrast.    Follow up in 2-3 months with stereo plates. (Dilated 11/6/2023)

## 2023-11-10 ENCOUNTER — ANCILLARY PROCEDURE (OUTPATIENT)
Dept: RADIOLOGY | Facility: CLINIC | Age: 58
End: 2023-11-10
Payer: OTHER GOVERNMENT

## 2023-11-10 ENCOUNTER — APPOINTMENT (OUTPATIENT)
Dept: RADIOLOGY | Facility: CLINIC | Age: 58
End: 2023-11-10
Payer: OTHER GOVERNMENT

## 2023-11-10 DIAGNOSIS — Z12.31 ENCOUNTER FOR SCREENING MAMMOGRAM FOR MALIGNANT NEOPLASM OF BREAST: ICD-10-CM

## 2023-11-10 PROCEDURE — 76642 ULTRASOUND BREAST LIMITED: CPT | Mod: BILATERAL PROCEDURE | Performed by: STUDENT IN AN ORGANIZED HEALTH CARE EDUCATION/TRAINING PROGRAM

## 2023-11-10 PROCEDURE — 76641 ULTRASOUND BREAST COMPLETE: CPT | Mod: 50

## 2023-11-13 DIAGNOSIS — B35.1 ONYCHOMYCOSIS: ICD-10-CM

## 2023-11-13 NOTE — TELEPHONE ENCOUNTER
Dr Machuca pt     Pt phoned office and is requesting refill     Terbinafine    [facility]

## 2023-11-14 RX ORDER — TERBINAFINE HYDROCHLORIDE 250 MG/1
250 TABLET ORAL DAILY
Qty: 30 TABLET | Refills: 2 | Status: SHIPPED | OUTPATIENT
Start: 2023-11-14 | End: 2024-02-20

## 2023-12-05 ENCOUNTER — HOSPITAL ENCOUNTER (OUTPATIENT)
Dept: RADIOLOGY | Facility: CLINIC | Age: 58
Discharge: HOME | End: 2023-12-05
Payer: OTHER GOVERNMENT

## 2023-12-05 ENCOUNTER — APPOINTMENT (OUTPATIENT)
Dept: OPHTHALMOLOGY | Facility: CLINIC | Age: 58
End: 2023-12-05
Payer: OTHER GOVERNMENT

## 2023-12-05 DIAGNOSIS — H53.2 DIPLOPIA: ICD-10-CM

## 2023-12-05 PROCEDURE — 70543 MRI ORBT/FAC/NCK W/O &W/DYE: CPT | Performed by: RADIOLOGY

## 2023-12-05 PROCEDURE — 70543 MRI ORBT/FAC/NCK W/O &W/DYE: CPT

## 2023-12-05 PROCEDURE — A9575 INJ GADOTERATE MEGLUMI 0.1ML: HCPCS | Performed by: PSYCHIATRY & NEUROLOGY

## 2023-12-05 PROCEDURE — 70553 MRI BRAIN STEM W/O & W/DYE: CPT

## 2023-12-05 PROCEDURE — 2550000001 HC RX 255 CONTRASTS: Performed by: PSYCHIATRY & NEUROLOGY

## 2023-12-05 PROCEDURE — 70553 MRI BRAIN STEM W/O & W/DYE: CPT | Performed by: RADIOLOGY

## 2023-12-05 RX ORDER — GADOTERATE MEGLUMINE 376.9 MG/ML
13 INJECTION INTRAVENOUS
Status: COMPLETED | OUTPATIENT
Start: 2023-12-05 | End: 2023-12-05

## 2023-12-05 RX ADMIN — GADOTERATE MEGLUMINE 13 ML: 376.9 INJECTION INTRAVENOUS at 14:07

## 2023-12-06 ENCOUNTER — APPOINTMENT (OUTPATIENT)
Dept: OPHTHALMOLOGY | Facility: CLINIC | Age: 58
End: 2023-12-06
Payer: OTHER GOVERNMENT

## 2024-01-08 ENCOUNTER — APPOINTMENT (OUTPATIENT)
Dept: OBSTETRICS AND GYNECOLOGY | Facility: CLINIC | Age: 59
End: 2024-01-08
Payer: OTHER GOVERNMENT

## 2024-01-18 ENCOUNTER — OFFICE VISIT (OUTPATIENT)
Dept: OPHTHALMOLOGY | Facility: CLINIC | Age: 59
End: 2024-01-18
Payer: OTHER GOVERNMENT

## 2024-01-18 DIAGNOSIS — H53.2 DIPLOPIA: Primary | ICD-10-CM

## 2024-01-18 DIAGNOSIS — H49.12 PARALYTIC STRABISMUS ASSOCIATED WITH LEFT TROCHLEAR NERVE PALSY: ICD-10-CM

## 2024-01-18 PROCEDURE — 92012 INTRM OPH EXAM EST PATIENT: CPT | Performed by: OPHTHALMOLOGY

## 2024-01-18 ASSESSMENT — SLIT LAMP EXAM - LIDS
COMMENTS: NORMAL
COMMENTS: NORMAL

## 2024-01-18 ASSESSMENT — EXTERNAL EXAM - RIGHT EYE: OD_EXAM: NORMAL

## 2024-01-18 ASSESSMENT — TONOMETRY
OD_IOP_MMHG: 17
IOP_METHOD: GOLDMANN APPLANATION
OS_IOP_MMHG: 15

## 2024-01-18 ASSESSMENT — REFRACTION_WEARINGRX
OS_VPRISM: 6.0
OD_CYLINDER: +3.50
OS_AXIS: 090
OS_SPHERE: -8.25
OD_AXIS: 102
OS_CYLINDER: +4.00
OS_ADD: +2.50
OD_VBASE: UP
OD_SPHERE: -7.00
OD_ADD: +2.50
OS_VBASE: UP
OD_VPRISM: 6.0

## 2024-01-18 ASSESSMENT — VISUAL ACUITY
METHOD: SNELLEN - LINEAR
OD_CC: 20/20

## 2024-01-18 ASSESSMENT — PACHYMETRY
OS_CT(UM): 503
OD_CT(UM): 525

## 2024-01-18 ASSESSMENT — EXTERNAL EXAM - LEFT EYE: OS_EXAM: NORMAL

## 2024-01-18 NOTE — PROGRESS NOTES
Assessment/Plan   Diagnoses and all orders for this visit:  Diplopia  Pr is doing better with press-on prisms -- interested in exploring surgical options  Refer to Dr. 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 Quesada tomorrow to finish workup    .Return for a dilated exam in  12  months or sooner if having any problems

## 2024-01-19 ENCOUNTER — APPOINTMENT (OUTPATIENT)
Dept: OPHTHALMOLOGY | Facility: CLINIC | Age: 59
End: 2024-01-19
Payer: OTHER GOVERNMENT

## 2024-01-19 NOTE — PROGRESS NOTES
Assessment and Plan    12/05/2023 MRI brain & orbits with contrast, which I personally reviewed, shows non-specific bihemispheric white matter FLAIR lesions.    10/03/2023 OCT macula OD normal foveal contour 306 & OS normal foveal contour 200.    This 58 year-old woman  with a history of strabismus surgery 1972, IBS, acne, asthma presents in follow up for evaluation of ocular misalignment previously with left hypertropia overall consistent with cranial nerve (CN) IV palsy.    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 She chance of a new or different problem, such as a mass lesion affecting cranial nerve (CN) IV is small, but worth consideration. I doubt ocular myasthenia or thyroid eye disease without more signs and with such consistency from Dr. Edouard to now. We discussed whether to pursue imaging versus a combination of Fresnel for now with possible permanent prism and/or further strabismus surgery in the future.    Plan    Fresnel for now.  Check MRI brain & orbits with contrast.    Follow up in 2-3 months with stereo plates. (Dilated 11/6/2023)

## 2024-01-26 ENCOUNTER — PREP FOR PROCEDURE (OUTPATIENT)
Dept: OPHTHALMOLOGY | Facility: CLINIC | Age: 59
End: 2024-01-26

## 2024-01-26 ENCOUNTER — CONSULT (OUTPATIENT)
Dept: OPHTHALMOLOGY | Facility: CLINIC | Age: 59
End: 2024-01-26
Payer: OTHER GOVERNMENT

## 2024-01-26 DIAGNOSIS — H52.203 ASTIGMATISM OF BOTH EYES, UNSPECIFIED TYPE: ICD-10-CM

## 2024-01-26 DIAGNOSIS — H49.12 PARALYTIC STRABISMUS ASSOCIATED WITH LEFT TROCHLEAR NERVE PALSY: Primary | ICD-10-CM

## 2024-01-26 DIAGNOSIS — H52.13 HIGH MYOPIA, BILATERAL: ICD-10-CM

## 2024-01-26 PROCEDURE — 99214 OFFICE O/P EST MOD 30 MIN: CPT | Performed by: OPHTHALMOLOGY

## 2024-01-26 PROCEDURE — 92060 SENSORIMOTOR EXAMINATION: CPT | Performed by: OPHTHALMOLOGY

## 2024-01-26 ASSESSMENT — REFRACTION_WEARINGRX
OS_VBASE: UP
OS_ADD: +2.50
OS_SPHERE: -8.25
OD_ADD: +2.50
OD_SPHERE: -7.00
OD_VBASE: UP
OD_CYLINDER: +3.50
OD_VPRISM: 6.0
OD_AXIS: 102
OS_CYLINDER: +4.00
OS_VPRISM: 6.0
OS_AXIS: 090

## 2024-01-26 ASSESSMENT — ENCOUNTER SYMPTOMS
GASTROINTESTINAL NEGATIVE: 0
ALLERGIC/IMMUNOLOGIC NEGATIVE: 0
RESPIRATORY NEGATIVE: 0
MUSCULOSKELETAL NEGATIVE: 0
PSYCHIATRIC NEGATIVE: 0
NEUROLOGICAL NEGATIVE: 0
HEMATOLOGIC/LYMPHATIC NEGATIVE: 0
EYES NEGATIVE: 1
CARDIOVASCULAR NEGATIVE: 0
CONSTITUTIONAL NEGATIVE: 0
ENDOCRINE NEGATIVE: 0

## 2024-01-26 ASSESSMENT — PACHYMETRY
OS_CT(UM): 503
OD_CT(UM): 525

## 2024-01-26 ASSESSMENT — VISUAL ACUITY
OD_CC+: -1
OS_CC: 20/25
OS_CC+: -2+2
CORRECTION_TYPE: GLASSES
METHOD: SNELLEN - LINEAR
OD_CC: 20/20

## 2024-01-26 ASSESSMENT — CONF VISUAL FIELD: COMMENTS: NOT ASSESSED

## 2024-01-26 ASSESSMENT — SLIT LAMP EXAM - LIDS
COMMENTS: NORMAL
COMMENTS: NORMAL

## 2024-01-26 ASSESSMENT — EXTERNAL EXAM - RIGHT EYE: OD_EXAM: NORMAL

## 2024-01-26 ASSESSMENT — EXTERNAL EXAM - LEFT EYE: OS_EXAM: NORMAL

## 2024-01-26 NOTE — PROGRESS NOTES
NP with history of childhood exotropia, reports strabismus surgery as a child. Today examination is consistent with left cranial nerve (CN) IV palsy. She has a LH(T) of 10 at distance, which worsens on left head tilt, with mild excyclotorsion of 3 degrees.     Discussed with patient that options are observation vs prisms vs surgery. At this point she would like to think about it.   I reviewed the risks and benefits of the proposed strabismus surgery including the possibility of over or undercorrection and the potential need for reoperation in the near or distant future.  I discussed the possible lack of binocular vision despite surgery and the possibility of postoperative diplopia.  There is a chance that glasses or prisms could be necessary in the postoperative period.  I also discussed the risks of infection, hemorrhage, loss of vision or complications from general anesthesia and other surgical imponderables.  All questions were reviewed and answered.   F/u in 3 months otherwise.

## 2024-01-31 ENCOUNTER — APPOINTMENT (OUTPATIENT)
Dept: OPHTHALMOLOGY | Facility: CLINIC | Age: 59
End: 2024-01-31
Payer: OTHER GOVERNMENT

## 2024-02-01 ENCOUNTER — APPOINTMENT (OUTPATIENT)
Dept: OPHTHALMOLOGY | Facility: CLINIC | Age: 59
End: 2024-02-01
Payer: OTHER GOVERNMENT

## 2024-02-08 ENCOUNTER — OFFICE VISIT (OUTPATIENT)
Dept: OPHTHALMOLOGY | Facility: CLINIC | Age: 59
End: 2024-02-08
Payer: OTHER GOVERNMENT

## 2024-02-08 DIAGNOSIS — H49.12 PARALYTIC STRABISMUS ASSOCIATED WITH LEFT TROCHLEAR NERVE PALSY: ICD-10-CM

## 2024-02-08 DIAGNOSIS — H53.2 DIPLOPIA: Primary | ICD-10-CM

## 2024-02-08 PROCEDURE — 92060 SENSORIMOTOR EXAMINATION: CPT | Performed by: PSYCHIATRY & NEUROLOGY

## 2024-02-08 PROCEDURE — 99213 OFFICE O/P EST LOW 20 MIN: CPT | Performed by: PSYCHIATRY & NEUROLOGY

## 2024-02-08 RX ORDER — LYSINE HCL 100 %
POWDER (GRAM) MISCELLANEOUS
COMMUNITY

## 2024-02-08 ASSESSMENT — EXTERNAL EXAM - LEFT EYE: OS_EXAM: NORMAL

## 2024-02-08 ASSESSMENT — ENCOUNTER SYMPTOMS
MUSCULOSKELETAL NEGATIVE: 0
GASTROINTESTINAL NEGATIVE: 0
CONSTITUTIONAL NEGATIVE: 0
ALLERGIC/IMMUNOLOGIC NEGATIVE: 0
HEMATOLOGIC/LYMPHATIC NEGATIVE: 0
CARDIOVASCULAR NEGATIVE: 0
NEUROLOGICAL NEGATIVE: 0
PSYCHIATRIC NEGATIVE: 0
EYES NEGATIVE: 1
RESPIRATORY NEGATIVE: 0
ENDOCRINE NEGATIVE: 0

## 2024-02-08 ASSESSMENT — REFRACTION_WEARINGRX
OD_VBASE: UP
OS_SPHERE: -8.25
OS_AXIS: 090
OS_ADD: +2.50
OS_CYLINDER: +4.00
OD_VPRISM: 6.0
OS_VBASE: UP
OD_CYLINDER: +3.50
OD_AXIS: 102
OD_SPHERE: -7.00
OD_ADD: +2.50
OS_VPRISM: 6.0

## 2024-02-08 ASSESSMENT — CONF VISUAL FIELD
OS_SUPERIOR_NASAL_RESTRICTION: 0
OD_SUPERIOR_NASAL_RESTRICTION: 0
OS_INFERIOR_NASAL_RESTRICTION: 0
METHOD: COUNTING FINGERS
OS_NORMAL: 1
OD_INFERIOR_NASAL_RESTRICTION: 0
OS_INFERIOR_TEMPORAL_RESTRICTION: 0
OD_INFERIOR_TEMPORAL_RESTRICTION: 0
OS_SUPERIOR_TEMPORAL_RESTRICTION: 0
OD_SUPERIOR_TEMPORAL_RESTRICTION: 0
OD_NORMAL: 1

## 2024-02-08 ASSESSMENT — VISUAL ACUITY
OD_CC: 20/20
CORRECTION_TYPE: GLASSES
OS_CC+: -2
METHOD: SNELLEN - LINEAR

## 2024-02-08 ASSESSMENT — SLIT LAMP EXAM - LIDS
COMMENTS: NORMAL
COMMENTS: NORMAL

## 2024-02-08 ASSESSMENT — PACHYMETRY
OD_CT(UM): 525
OS_CT(UM): 503

## 2024-02-08 ASSESSMENT — EXTERNAL EXAM - RIGHT EYE: OD_EXAM: NORMAL

## 2024-02-08 ASSESSMENT — TONOMETRY
IOP_METHOD: GOLDMANN APPLANATION
OS_IOP_MMHG: 20
OD_IOP_MMHG: 20

## 2024-02-08 ASSESSMENT — CUP TO DISC RATIO
OD_RATIO: 0.35
OS_RATIO: 0.35

## 2024-02-08 NOTE — PROGRESS NOTES
Assessment and Plan    12/05/2023 MRI brain & orbits with contrast, which I personally reviewed previously, shows non-specific bihemispheric white matter FLAIR lesions.    10/03/2023 OCT macula OD normal foveal contour 306 & OS normal foveal contour 200.    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    I see no change in the previously measured left hypertropia with incomitance that follows the pattern of cranial nerve (CN) IV palsy. I see no reason to suspect thyroid eye disease or ocular myasthenia, and imaging was negative for a structural lesion affecting cranial nerve (CN) IV. I recommend Fresnel for now with strabismus surgery when/if she is ready.    Plan    Fresnel for now.  Strabismus care per Dr. Preston or Javan.    Follow up depending on surgical outcome with stereo plates. (Dilated 11/6/2023)

## 2024-02-08 NOTE — LETTER
February 8, 2024     Yana Edouard MD  [address] [place] [address] Of  Ophthalmology  [facility] [id number]    Patient: Tiffanie Maldonado   YOB: 1965   Date of Visit: 2/8/2024     Dear Dr. Yana Edouard MD:    I am writing to share my findings regarding our shared patient Tiffanie Maldonado from her visit with me on 2/8/2024.    HPI    PT saw Dr Preston and has questions about strabismus surgery from a neurological perspective. 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    She had a telemedicine appointment with Dr. Yana Edouard of the [facility] 12/19/2023.    She saw strabismus surgeon Dr. Laurie Preston 1/26/2024 and is inclined toward pursuing strabismus surgery.  Last edited by Shakeel Quesada MD PhD on 2/8/2024  4:31 PM.        Diagnoses    There are no diagnoses linked to this encounter.  Assessment and Plan    12/05/2023 MRI brain & orbits with contrast, which I personally reviewed previously, shows non-specific bihemispheric white matter FLAIR lesions.    10/03/2023 OCT macula OD normal foveal contour 306 & OS normal foveal contour 200.    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    I see no change in the previously measured left hypertropia with incomitance that follows the pattern of cranial nerve (CN) IV palsy. I see no reason to suspect thyroid eye disease or ocular myasthenia, and imaging was negative for a structural lesion affecting cranial nerve (CN) IV. I recommend Cat for now  with strabismus surgery when/if she is ready.    Plan    Fresnel for now.  Strabismus care per Dr. Preston or Javan.    Follow up depending on surgical outcome with stereo plates. (Dilated 11/6/2023)      Below you will find my full examination. I appreciate the opportunity to see Tiffanie Maldonado today and to share in her care with you. Please contact me if you have questions for me regarding this visit or if I can be of assistance to another of your patients with neuro-ophthalmological problems.    Sincerely,    Shakeel Quesada MD PhD    CC:   No Recipients      Base Eye Exam       Visual Acuity (Snellen - Linear)         Right Left    Dist cc 20/20 20/20 fresnel -2      Correction: Glasses              Tonometry (Goldmann Applanation, 4:07 PM)         Right Left    Pressure 20 20              Visual Fields (Counting fingers)         Left Right     Full Full              Extraocular Movement         Right Left     Full Full              Neuro/Psych       Oriented x3: Yes    Mood/Affect: Normal                  Additional Tests       Color         Right Left    Ishihara 11/11 11/11              Stereo       Fly: +    Animals: 3/3    Circles: 5/9                  Strabismus Exam       Reading #1   (Edited by: Shakeel Quesada MD PhD)      Correction: cc      Distance Near Near +3DS N Bifocals    LH(T) 10                  0 0 0   +2 0 0                       0  0   0  0                       0 0 0   0 0 0                   R Tilt L Tilt   LH(T) 8 LH(T) 12                              Reading #2   (Edited by: Shakeel Quesada MD PhD)      Method: Bolwell distance with glasses with Fresnel off      Distance Near Near +3DS N Bifocals                      - - - - - -   - - - - - -            LHT 12           - -  - -  XT 6 - -  - -  XT 6     LHT 18     LHT 10     LHT 10      - - - - - -  XT 6 - - - - - -          LHT 12                     Slit Lamp and Fundus Exam       External Exam         Right Left    External  Normal Normal              Slit Lamp Exam         Right Left    Lids/Lashes Normal Normal    Conjunctiva/Sclera White and quiet White and quiet    Cornea Clear Clear    Anterior Chamber Deep and quiet Deep and quiet    Iris Round and reactive Round and reactive    Lens Clear Clear    Anterior Vitreous Normal Normal              Fundus Exam         Right Left    Disc Normal Normal    C/D Ratio 0.35 0.35    Macula Normal Normal    Vessels Normal Normal    Periphery Normal Normal                  Refraction       Wearing Rx         Sphere Cylinder Axis Add Vert Prism    Right -7.00 +3.50 102 +2.50 6.0 up    Left -8.25 +4.00 090 +2.50 6.0 up

## 2024-02-19 DIAGNOSIS — B35.1 ONYCHOMYCOSIS: ICD-10-CM

## 2024-02-19 NOTE — TELEPHONE ENCOUNTER
pt    Refill on terbinafine (LamISIL) 250 mg tablet   Ov :10/25/23  Pharmacy    [id number] - [id number]

## 2024-02-20 RX ORDER — TERBINAFINE HYDROCHLORIDE 250 MG/1
250 TABLET ORAL DAILY
Qty: 30 TABLET | Refills: 2 | Status: SHIPPED | OUTPATIENT
Start: 2024-02-20

## 2024-02-20 RX ORDER — TERBINAFINE HYDROCHLORIDE 250 MG/1
250 TABLET ORAL DAILY
Qty: 30 TABLET | Refills: 2 | Status: SHIPPED | OUTPATIENT
Start: 2024-02-20 | End: 2024-03-05 | Stop reason: ALTCHOICE

## 2024-03-19 ENCOUNTER — ANESTHESIA (OUTPATIENT)
Dept: OPERATING ROOM | Facility: CLINIC | Age: 59
End: 2024-03-19
Payer: OTHER GOVERNMENT

## 2024-03-19 ENCOUNTER — HOSPITAL ENCOUNTER (OUTPATIENT)
Facility: CLINIC | Age: 59
Setting detail: OUTPATIENT SURGERY
Discharge: HOME | End: 2024-03-19
Attending: OPHTHALMOLOGY | Admitting: OPHTHALMOLOGY
Payer: OTHER GOVERNMENT

## 2024-03-19 ENCOUNTER — ANESTHESIA EVENT (OUTPATIENT)
Dept: OPERATING ROOM | Facility: CLINIC | Age: 59
End: 2024-03-19
Payer: OTHER GOVERNMENT

## 2024-03-19 VITALS
HEART RATE: 51 BPM | HEIGHT: 64 IN | OXYGEN SATURATION: 99 % | DIASTOLIC BLOOD PRESSURE: 75 MMHG | TEMPERATURE: 96.8 F | BODY MASS INDEX: 25.07 KG/M2 | WEIGHT: 146.83 LBS | SYSTOLIC BLOOD PRESSURE: 117 MMHG | RESPIRATION RATE: 16 BRPM

## 2024-03-19 PROBLEM — T88.53XA AWARENESS UNDER ANESTHESIA: Status: ACTIVE | Noted: 2024-03-19

## 2024-03-19 PROCEDURE — 2500000005 HC RX 250 GENERAL PHARMACY W/O HCPCS: Performed by: NURSE ANESTHETIST, CERTIFIED REGISTERED

## 2024-03-19 PROCEDURE — 2500000001 HC RX 250 WO HCPCS SELF ADMINISTERED DRUGS (ALT 637 FOR MEDICARE OP): Performed by: OPHTHALMOLOGY

## 2024-03-19 PROCEDURE — 2500000001 HC RX 250 WO HCPCS SELF ADMINISTERED DRUGS (ALT 637 FOR MEDICARE OP): Performed by: STUDENT IN AN ORGANIZED HEALTH CARE EDUCATION/TRAINING PROGRAM

## 2024-03-19 PROCEDURE — 67314 REVISE EYE MUSCLE: CPT

## 2024-03-19 PROCEDURE — 3700000002 HC GENERAL ANESTHESIA TIME - EACH INCREMENTAL 1 MINUTE: Performed by: OPHTHALMOLOGY

## 2024-03-19 PROCEDURE — 3600000003 HC OR TIME - INITIAL BASE CHARGE - PROCEDURE LEVEL THREE: Performed by: OPHTHALMOLOGY

## 2024-03-19 PROCEDURE — A67314 PR STABISMUS SURG,ONE VERT MUSCLE: Performed by: NURSE ANESTHETIST, CERTIFIED REGISTERED

## 2024-03-19 PROCEDURE — 7100000002 HC RECOVERY ROOM TIME - EACH INCREMENTAL 1 MINUTE: Performed by: OPHTHALMOLOGY

## 2024-03-19 PROCEDURE — 7100000010 HC PHASE TWO TIME - EACH INCREMENTAL 1 MINUTE: Performed by: OPHTHALMOLOGY

## 2024-03-19 PROCEDURE — A67314 PR STABISMUS SURG,ONE VERT MUSCLE: Performed by: ANESTHESIOLOGY

## 2024-03-19 PROCEDURE — 3600000008 HC OR TIME - EACH INCREMENTAL 1 MINUTE - PROCEDURE LEVEL THREE: Performed by: OPHTHALMOLOGY

## 2024-03-19 PROCEDURE — 7100000009 HC PHASE TWO TIME - INITIAL BASE CHARGE: Performed by: OPHTHALMOLOGY

## 2024-03-19 PROCEDURE — 2500000004 HC RX 250 GENERAL PHARMACY W/ HCPCS (ALT 636 FOR OP/ED): Performed by: NURSE ANESTHETIST, CERTIFIED REGISTERED

## 2024-03-19 PROCEDURE — 2500000004 HC RX 250 GENERAL PHARMACY W/ HCPCS (ALT 636 FOR OP/ED): Performed by: OPHTHALMOLOGY

## 2024-03-19 PROCEDURE — 2720000007 HC OR 272 NO HCPCS: Performed by: OPHTHALMOLOGY

## 2024-03-19 PROCEDURE — A4217 STERILE WATER/SALINE, 500 ML: HCPCS | Performed by: OPHTHALMOLOGY

## 2024-03-19 PROCEDURE — 2500000005 HC RX 250 GENERAL PHARMACY W/O HCPCS: Performed by: OPHTHALMOLOGY

## 2024-03-19 PROCEDURE — 3700000001 HC GENERAL ANESTHESIA TIME - INITIAL BASE CHARGE: Performed by: OPHTHALMOLOGY

## 2024-03-19 PROCEDURE — 7100000001 HC RECOVERY ROOM TIME - INITIAL BASE CHARGE: Performed by: OPHTHALMOLOGY

## 2024-03-19 PROCEDURE — 2500000004 HC RX 250 GENERAL PHARMACY W/ HCPCS (ALT 636 FOR OP/ED): Performed by: STUDENT IN AN ORGANIZED HEALTH CARE EDUCATION/TRAINING PROGRAM

## 2024-03-19 RX ORDER — WATER 1 ML/ML
IRRIGANT IRRIGATION AS NEEDED
Status: DISCONTINUED | OUTPATIENT
Start: 2024-03-19 | End: 2024-03-19 | Stop reason: HOSPADM

## 2024-03-19 RX ORDER — FENTANYL CITRATE 50 UG/ML
INJECTION, SOLUTION INTRAMUSCULAR; INTRAVENOUS AS NEEDED
Status: DISCONTINUED | OUTPATIENT
Start: 2024-03-19 | End: 2024-03-19

## 2024-03-19 RX ORDER — POVIDONE-IODINE 5 %
SOLUTION, NON-ORAL OPHTHALMIC (EYE) AS NEEDED
Status: DISCONTINUED | OUTPATIENT
Start: 2024-03-19 | End: 2024-03-19 | Stop reason: HOSPADM

## 2024-03-19 RX ORDER — NEOMYCIN SULFATE, POLYMYXIN B SULFATE AND DEXAMETHASONE 3.5; 10000; 1 MG/ML; [USP'U]/ML; MG/ML
SUSPENSION/ DROPS OPHTHALMIC AS NEEDED
Status: DISCONTINUED | OUTPATIENT
Start: 2024-03-19 | End: 2024-03-19 | Stop reason: HOSPADM

## 2024-03-19 RX ORDER — SCOLOPAMINE TRANSDERMAL SYSTEM 1 MG/1
PATCH, EXTENDED RELEASE TRANSDERMAL AS NEEDED
Status: DISCONTINUED | OUTPATIENT
Start: 2024-03-19 | End: 2024-03-19

## 2024-03-19 RX ORDER — TETRACAINE HYDROCHLORIDE 5 MG/ML
SOLUTION OPHTHALMIC AS NEEDED
Status: DISCONTINUED | OUTPATIENT
Start: 2024-03-19 | End: 2024-03-19 | Stop reason: HOSPADM

## 2024-03-19 RX ORDER — LIDOCAINE HYDROCHLORIDE 20 MG/ML
INJECTION, SOLUTION INFILTRATION; PERINEURAL AS NEEDED
Status: DISCONTINUED | OUTPATIENT
Start: 2024-03-19 | End: 2024-03-19

## 2024-03-19 RX ORDER — PROPOFOL 10 MG/ML
INJECTION, EMULSION INTRAVENOUS CONTINUOUS PRN
Status: DISCONTINUED | OUTPATIENT
Start: 2024-03-19 | End: 2024-03-19

## 2024-03-19 RX ORDER — GABAPENTIN 300 MG/1
CAPSULE ORAL AS NEEDED
Status: DISCONTINUED | OUTPATIENT
Start: 2024-03-19 | End: 2024-03-19

## 2024-03-19 RX ORDER — PROPOFOL 10 MG/ML
INJECTION, EMULSION INTRAVENOUS AS NEEDED
Status: DISCONTINUED | OUTPATIENT
Start: 2024-03-19 | End: 2024-03-19

## 2024-03-19 RX ORDER — SODIUM CHLORIDE, SODIUM LACTATE, POTASSIUM CHLORIDE, CALCIUM CHLORIDE 600; 310; 30; 20 MG/100ML; MG/100ML; MG/100ML; MG/100ML
INJECTION, SOLUTION INTRAVENOUS CONTINUOUS PRN
Status: DISCONTINUED | OUTPATIENT
Start: 2024-03-19 | End: 2024-03-19

## 2024-03-19 RX ORDER — KETOROLAC TROMETHAMINE 30 MG/ML
INJECTION, SOLUTION INTRAMUSCULAR; INTRAVENOUS AS NEEDED
Status: DISCONTINUED | OUTPATIENT
Start: 2024-03-19 | End: 2024-03-19

## 2024-03-19 RX ORDER — ONDANSETRON HYDROCHLORIDE 2 MG/ML
INJECTION, SOLUTION INTRAVENOUS AS NEEDED
Status: DISCONTINUED | OUTPATIENT
Start: 2024-03-19 | End: 2024-03-19

## 2024-03-19 RX ORDER — GLYCOPYRROLATE 0.2 MG/ML
INJECTION INTRAMUSCULAR; INTRAVENOUS AS NEEDED
Status: DISCONTINUED | OUTPATIENT
Start: 2024-03-19 | End: 2024-03-19

## 2024-03-19 RX ORDER — MIDAZOLAM HYDROCHLORIDE 1 MG/ML
INJECTION, SOLUTION INTRAMUSCULAR; INTRAVENOUS AS NEEDED
Status: DISCONTINUED | OUTPATIENT
Start: 2024-03-19 | End: 2024-03-19

## 2024-03-19 RX ADMIN — GLYCOPYRROLATE 0.2 MG: 0.2 INJECTION INTRAMUSCULAR; INTRAVENOUS at 10:53

## 2024-03-19 RX ADMIN — PROPOFOL 200 MG: 10 INJECTION, EMULSION INTRAVENOUS at 10:42

## 2024-03-19 RX ADMIN — MIDAZOLAM 2 MG: 1 INJECTION INTRAMUSCULAR; INTRAVENOUS at 10:39

## 2024-03-19 RX ADMIN — FENTANYL CITRATE 100 MCG: 50 INJECTION, SOLUTION INTRAMUSCULAR; INTRAVENOUS at 10:42

## 2024-03-19 RX ADMIN — KETOROLAC TROMETHAMINE 30 MG: 30 INJECTION, SOLUTION INTRAMUSCULAR at 11:06

## 2024-03-19 RX ADMIN — PROPOFOL 50 MCG/KG/MIN: 10 INJECTION, EMULSION INTRAVENOUS at 10:46

## 2024-03-19 RX ADMIN — SCOPALAMINE 1 PATCH: 1 PATCH, EXTENDED RELEASE TRANSDERMAL at 10:03

## 2024-03-19 RX ADMIN — DEXAMETHASONE SODIUM PHOSPHATE 4 MG: 4 INJECTION, SOLUTION INTRAMUSCULAR; INTRAVENOUS at 10:51

## 2024-03-19 RX ADMIN — GABAPENTIN 300 MG: 300 CAPSULE ORAL at 10:03

## 2024-03-19 RX ADMIN — LIDOCAINE HYDROCHLORIDE 60 ML: 20 INJECTION, SOLUTION INFILTRATION; PERINEURAL at 10:42

## 2024-03-19 RX ADMIN — ONDANSETRON 4 MG: 2 INJECTION INTRAMUSCULAR; INTRAVENOUS at 11:04

## 2024-03-19 RX ADMIN — SODIUM CHLORIDE, POTASSIUM CHLORIDE, SODIUM LACTATE AND CALCIUM CHLORIDE: 600; 310; 30; 20 INJECTION, SOLUTION INTRAVENOUS at 10:36

## 2024-03-19 SDOH — HEALTH STABILITY: MENTAL HEALTH: CURRENT SMOKER: 0

## 2024-03-19 ASSESSMENT — PAIN SCALES - GENERAL
PAINLEVEL_OUTOF10: 0 - NO PAIN
PAIN_LEVEL: 0
PAINLEVEL_OUTOF10: 4
PAINLEVEL_OUTOF10: 6
PAINLEVEL_OUTOF10: 0 - NO PAIN
PAINLEVEL_OUTOF10: 3
PAINLEVEL_OUTOF10: 5 - MODERATE PAIN

## 2024-03-19 ASSESSMENT — PAIN - FUNCTIONAL ASSESSMENT
PAIN_FUNCTIONAL_ASSESSMENT: 0-10

## 2024-03-19 ASSESSMENT — COLUMBIA-SUICIDE SEVERITY RATING SCALE - C-SSRS
2. HAVE YOU ACTUALLY HAD ANY THOUGHTS OF KILLING YOURSELF?: NO
1. IN THE PAST MONTH, HAVE YOU WISHED YOU WERE DEAD OR WISHED YOU COULD GO TO SLEEP AND NOT WAKE UP?: NO
6. HAVE YOU EVER DONE ANYTHING, STARTED TO DO ANYTHING, OR PREPARED TO DO ANYTHING TO END YOUR LIFE?: NO

## 2024-03-19 NOTE — DISCHARGE INSTRUCTIONS
1. No swimming or pooled water to eye for two weeks, ok to shower  2. Start maxitrol eye drops three times daily, left eye  3. Tylenol/ibuprofen as needed for pain  4. Follow up with pediatric ophthalmology in 3-7 days   TO REACH YOUR PHYSICIAN AFTER HOURS CALL  AND ASK FOR THE PHYSICIAN ON CALL  May have Tylenol six hours after your morning dose taken at home    May have Ibuprofen/advil/motrin/aleve after: 5:00 pm  You may alternate between ibuprofen/motrin/aleve and three hours later you may take tylenol    Laurie Preston MD  Medical Director for Quality, [facility]  Pediatric Ophthalmology  Primary Office:   [facility]  [facility] [address] [address] [place] [address]  Email: [email or web address]  Pager: [phone]  Scheduling Phone: [phone]    Scopalamine patch may stay on for 72 hrs.  Remove patch, discard away form pets, children.  Do not touch eyes!  Wash hands thoroughly

## 2024-03-19 NOTE — ANESTHESIA PROCEDURE NOTES
Airway  Date/Time: 3/19/2024 10:44 AM    Staffing  Performed: CRNA   Authorized by: Fahad Peacock MD    Performed by: OSCAR Aldrich-KYREE  Patient location during procedure: OR    Indications and Patient Condition  Indications for airway management: anesthesia  Spontaneous ventilation: present  Sedation level: deep  Preoxygenated: yes  Patient position: sniffing  MILS maintained throughout  Mask difficulty assessment: 1 - vent by mask    Final Airway Details  Final airway type: supraglottic airway      Successful airway: Size 4     Number of attempts at approach: 1

## 2024-03-19 NOTE — ANESTHESIA POSTPROCEDURE EVALUATION
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 [H49.12])    Surgeons: Laurie Preston MD Responsible Provider: Fahad Peacock MD    Anesthesia Type: general ASA Status: 2            Anesthesia Type: general    Vitals Value Taken Time   BP 95/53 03/19/24 1119   Temp 36.3 °C (97.3 °F) 03/19/24 1119   Pulse 76 03/19/24 1119   Resp 16 03/19/24 1119   SpO2 96 % 03/19/24 1119       Anesthesia Post Evaluation    Patient location during evaluation: PACU  Patient participation: waiting for patient participation  Level of consciousness: sedated  Pain score: 0  Pain management: adequate  Airway patency: patent  Cardiovascular status: acceptable  Respiratory status: acceptable  Hydration status: acceptable  Postoperative Nausea and Vomiting: none        There were no known notable events for this encounter.

## 2024-03-19 NOTE — ANESTHESIA PREPROCEDURE EVALUATION
Patient: Tiffanie Maldonado    Procedure Information       Date/Time: 03/19/24 1000    Procedure: Resection Recession Eye (Left)    [facility]    Surgeons: Laurie Preston MD            Relevant Problems   Anesthesia   (+) Awareness under anesthesia      GI   (+) IBS (irritable bowel syndrome)      /Renal   (+) Recurrent UTI      Neuro/Psych   (+) Anxiety   (+) Cervical radiculopathy, chronic   (+) Depression   (+) OCD (obsessive compulsive disorder)      Pulmonary   (+) Asthma      Musculoskeletal   (+) Disc degeneration, lumbar   (+) Osteoarthritis of hands, bilateral      Eyes, Ears, Nose, and Throat   (+) Bilateral high frequency sensorineural hearing loss      Infectious Disease   (+) Influenza A   (+) Onychomycosis   (+) Recurrent UTI   (+) Yeast infection     Past Surgical History:   Procedure Laterality Date    CERVICAL BIOPSY  W/ LOOP ELECTRODE EXCISION  09/08/2015    Cervical Loop Electrosurgical Excision (LEEP)    OTHER SURGICAL HISTORY  11/18/2021    Wrist surgery    STRABISMUS SURGERY       Allergies   Allergen Reactions    Meloxicam Other and Dizziness    Moxifloxacin Dizziness, Other and Unknown    Doxycycline Hyclate Rash       Clinical information reviewed:   Tobacco  Allergies  Meds  Problems  Med Hx  Surg Hx   Fam Hx  Soc   Hx        NPO Detail:  No data recorded     Physical Exam    Airway  Mallampati: I  TM distance: >3 FB  Neck ROM: full     Cardiovascular - normal exam  Rate: normal     Dental        Pulmonary - normal exam     Abdominal            Anesthesia Plan    History of general anesthesia?: yes  History of complications of general anesthesia?: no    ASA 2     general     The patient is not a current smoker.    intravenous induction   Anesthetic plan and risks discussed with patient and spouse.    Plan discussed with attending.

## 2024-03-19 NOTE — H&P
History Of Present Illness  Tiffanie Maldonado is a 58 y.o. female presenting with LH(T) 2/2 L cranial nerve (CN) IV palsy here for left eye muscle surgery.      Past Medical History  Past Medical History:   Diagnosis Date    Anxiety     Influenza A 08/30/2023    Personal history of cervical dysplasia 09/08/2015    History of cervical dysplasia    Personal history of other diseases of the digestive system 09/08/2015    History of diverticulitis of colon    Strabismus        Surgical History  Past Surgical History:   Procedure Laterality Date    CERVICAL BIOPSY  W/ LOOP ELECTRODE EXCISION  09/08/2015    Cervical Loop Electrosurgical Excision (LEEP)    OTHER SURGICAL HISTORY  11/18/2021    Wrist surgery    STRABISMUS SURGERY          Social History  She reports that she has never smoked. She has never used smokeless tobacco. She reports current alcohol use of about 1.0 standard drink of alcohol per week. She reports that she does not use drugs.    Family History  Family History   Problem Relation Name Age of Onset    Heart disease Mother      Stroke Mother      Diabetes Mother      Heart disease Father      Diabetes Father      Hyperlipidemia Father      Heart disease Maternal Grandmother      Heart disease Maternal Grandfather      Heart disease Paternal Grandmother      Heart disease Paternal Grandfather          Allergies  Meloxicam, Moxifloxacin, and Doxycycline hyclate    Review of Systems  Constitutional: Negative.    HENT: Negative.     Eyes: Negative.    Respiratory: Negative.     Cardiovascular: Negative.    Gastrointestinal: Negative.    Endocrine: Negative.  .    Neurological: Negative.    Psychiatric/Behavioral: Negative.       Physical Exam  General: Alert and Oriented x3  Head and neck: atraumatic and normacephalic  Lungs: The chest wall is symmetric and without deformity. No signs of respiratory distress. Lung sounds are clear in all lobes bilaterally.   Heart: Regular rate and rhythm.   Abdomen: Soft  "and non-tender       Last Recorded Vitals  Blood pressure 129/68, pulse 67, temperature 36 °C (96.8 °F), temperature source Temporal, resp. rate 16, height 1.626 m (5' 4\"), weight 66.6 kg (146 lb 13.2 oz), SpO2 99 %.    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 female presenting with LH(T) 2/2 L cranial nerve (CN) IV palsy here for left eye muscle surgery.            Felicitas Srivastava MD    "

## 2024-03-19 NOTE — OP NOTE
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5mg/mL-10,000 unit/mL-0.1 % ophthalmic suspension 2 drop   sterile water irrigation solution 100 mL   tetracaine (PF) 0.5 % ophthalmic solution 2 drop   povidone-iodine 5 % ophthalmic solution 1 Application              Anesthesia Record               Intraprocedure I/O Totals          Intake    Propofol Drip 0.00 mL    The total shown is the total volume documented since Anesthesia Start was filed.    LR infusion 400.00 mL    Total Intake 400 mL          Specimen: No specimens collected     Staff:   Circulator: Rodolfo Garcia RN  Scrub Person: Niurka Snowden RN         Drains and/or Catheters: * None in log *    Tourniquet Times:         Implants:     Findings: Normal anatomy and forced ductions    Indications: Tiffanie Maldonado is an 58 y.o. 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    The patient was seen in the preoperative area. The risks, benefits, complications, treatment options, non-operative alternatives, expected recovery and outcomes were discussed with  the patient. The possibilities of reaction to medication, pulmonary aspiration, injury to surrounding structures, bleeding, recurrent infection, the need for additional procedures, failure to diagnose a condition, and creating a complication requiring transfusion or operation were discussed with the patient. The patient concurred with the proposed plan, giving informed consent.  The site of surgery was properly noted/marked if necessary per policy. The patient has been actively warmed in preoperative area. Preoperative antibiotics are not indicated. Venous thrombosis prophylaxis are not indicated.    Procedure Details:   The patient was brought to the operating room and was placed in a supine position. After the patient was positively identified through a typical time-out procedure, the patient received anesthesia and an LMA. Then the left eye was prepped and draped in the usual sterile ophthalmic fashion. An inferotemporal fornix conjunctival incision was performed. The inferior rectus muscles was identified and hooked. Lateral rectus muscle had been previously recessed and was not visible in this plane. Using these hooks, the eye was turned superonasally. The inferior oblique was then identified and freed from the soft surrounding tissues without disrupting the orbital septum or the vortex vein. The muscle was clamped at the level of the insertion to the globe and at the level of the intermuscular septum. The muscle was cauterized using handheld high temperature cautery. The muscle was then cut at the level of cauterization, and the clamps were removed. The conjunctiva was then closed with 2 interrupted 8-0 polysorb sutures in a buried fashion. Insippisated inferior meibomian glands were noted along central lid margin. Gentle pressure was applied with a cotton swap to express meibum from central and lateral glands. At the end, the left eye was cleaned. Tetracaine, 5% betadine eye solution, and maxitrol drops were  instilled in the eye. The patient was then awakened and the LMA was removed. The patient was transferred to the recovery room in good and stable condition. The patient tolerated the procedure and the anesthesia well.     Complications:  None; patient tolerated the procedure well.    Disposition: PACU - hemodynamically stable.  Condition: stable         Additional Details: none    Attending Attestation:     Laurie Preston  Phone Number: [phone]

## 2024-03-25 ENCOUNTER — TELEPHONE (OUTPATIENT)
Dept: OPHTHALMOLOGY | Facility: CLINIC | Age: 59
End: 2024-03-25

## 2024-03-25 NOTE — TELEPHONE ENCOUNTER
"Patient is 6 days status post (s/p) left inferior oblique myectomy. Called patient and she stated that her concern was from the initial 1-2 days post-op; since then she has felt a lot better. She had been feeling a \"gravel-y\" sensationin the corner of the eye and a dull pain that is resolving. Redness is starting to resolve as well. She has been using maxitrol drops TID and alternating Advil/Tylenol for pain. All questions addressed over the phone.  She has follow up with Dr. Preston tomorrow in [facility]; will keep appointment as scheduled. "

## 2024-03-25 NOTE — TELEPHONE ENCOUNTER
Patient called  stating that she needs a change in her eye drops. Patient is complaining of pain, and swelling. S/P 03/19/24  [phone]

## 2024-03-26 ENCOUNTER — OFFICE VISIT (OUTPATIENT)
Dept: OPHTHALMOLOGY | Facility: CLINIC | Age: 59
End: 2024-03-26
Payer: OTHER GOVERNMENT

## 2024-03-26 DIAGNOSIS — H52.203 ASTIGMATISM OF BOTH EYES, UNSPECIFIED TYPE: ICD-10-CM

## 2024-03-26 DIAGNOSIS — H49.12 PARALYTIC STRABISMUS ASSOCIATED WITH LEFT TROCHLEAR NERVE PALSY: Primary | ICD-10-CM

## 2024-03-26 DIAGNOSIS — H52.13 HIGH MYOPIA, BILATERAL: ICD-10-CM

## 2024-03-26 PROCEDURE — 99024 POSTOP FOLLOW-UP VISIT: CPT | Performed by: OPHTHALMOLOGY

## 2024-03-26 ASSESSMENT — VISUAL ACUITY
METHOD: SNELLEN - LINEAR
OD_CC: 20/20
CORRECTION_TYPE: GLASSES
OS_CC: 20/20

## 2024-03-26 ASSESSMENT — ENCOUNTER SYMPTOMS
EYES NEGATIVE: 0
PSYCHIATRIC NEGATIVE: 0
MUSCULOSKELETAL NEGATIVE: 0
GASTROINTESTINAL NEGATIVE: 0
ENDOCRINE NEGATIVE: 0
RESPIRATORY NEGATIVE: 0
NEUROLOGICAL NEGATIVE: 0
CARDIOVASCULAR NEGATIVE: 0
HEMATOLOGIC/LYMPHATIC NEGATIVE: 0
ALLERGIC/IMMUNOLOGIC NEGATIVE: 0
CONSTITUTIONAL NEGATIVE: 0

## 2024-03-26 ASSESSMENT — PACHYMETRY
OD_CT(UM): 525
OS_CT(UM): 503

## 2024-03-26 ASSESSMENT — EXTERNAL EXAM - RIGHT EYE: OD_EXAM: NORMAL

## 2024-03-26 ASSESSMENT — SLIT LAMP EXAM - LIDS
COMMENTS: NORMAL
COMMENTS: NORMAL

## 2024-03-26 ASSESSMENT — EXTERNAL EXAM - LEFT EYE: OS_EXAM: NORMAL

## 2024-03-26 NOTE — PROGRESS NOTES
58 YOF post op wk1 status post (s/p) left inferior oblique myectomy. Healing well, denies diplopia. Does notice some restriction on left gaze OS. Discussed continuing to move the ye around despite restriction sensation, will help with healing. Can stop the drops now. F/U 2 mo.

## 2024-04-22 ENCOUNTER — TELEPHONE (OUTPATIENT)
Dept: OPHTHALMOLOGY | Facility: CLINIC | Age: 59
End: 2024-04-22

## 2024-04-22 NOTE — TELEPHONE ENCOUNTER
Dr. Preston-Pt stated her L eye is very blurry, she recently had surgery 3/19/24. She stated that something is off. Best contact number attached [phone] or  her  number [phone]. Thank you    Patient called central scheduling

## 2024-05-06 ENCOUNTER — OFFICE VISIT (OUTPATIENT)
Dept: OPHTHALMOLOGY | Facility: CLINIC | Age: 59
End: 2024-05-06
Payer: OTHER GOVERNMENT

## 2024-05-06 DIAGNOSIS — H49.12 PARALYTIC STRABISMUS ASSOCIATED WITH LEFT TROCHLEAR NERVE PALSY: Primary | ICD-10-CM

## 2024-05-06 DIAGNOSIS — H52.203 ASTIGMATISM OF BOTH EYES, UNSPECIFIED TYPE: ICD-10-CM

## 2024-05-06 DIAGNOSIS — H52.13 HIGH MYOPIA, BILATERAL: ICD-10-CM

## 2024-05-06 DIAGNOSIS — H53.2 DIPLOPIA: ICD-10-CM

## 2024-05-06 PROCEDURE — 92015 DETERMINE REFRACTIVE STATE: CPT | Performed by: OPHTHALMOLOGY

## 2024-05-06 PROCEDURE — 99024 POSTOP FOLLOW-UP VISIT: CPT | Performed by: OPHTHALMOLOGY

## 2024-05-06 ASSESSMENT — CONF VISUAL FIELD
OD_INFERIOR_TEMPORAL_RESTRICTION: 0
OS_NORMAL: 1
OS_SUPERIOR_NASAL_RESTRICTION: 0
OD_SUPERIOR_TEMPORAL_RESTRICTION: 0
OS_INFERIOR_NASAL_RESTRICTION: 0
OD_SUPERIOR_NASAL_RESTRICTION: 0
METHOD: COUNTING FINGERS
OS_INFERIOR_TEMPORAL_RESTRICTION: 0
OS_SUPERIOR_TEMPORAL_RESTRICTION: 0
OD_INFERIOR_NASAL_RESTRICTION: 0
OD_NORMAL: 1

## 2024-05-06 ASSESSMENT — ENCOUNTER SYMPTOMS
HEMATOLOGIC/LYMPHATIC NEGATIVE: 0
ENDOCRINE NEGATIVE: 0
CONSTITUTIONAL NEGATIVE: 0
ALLERGIC/IMMUNOLOGIC NEGATIVE: 0
MUSCULOSKELETAL NEGATIVE: 0
PSYCHIATRIC NEGATIVE: 0
RESPIRATORY NEGATIVE: 0
GASTROINTESTINAL NEGATIVE: 0
CARDIOVASCULAR NEGATIVE: 0
NEUROLOGICAL NEGATIVE: 0
EYES NEGATIVE: 1

## 2024-05-06 ASSESSMENT — PACHYMETRY
OS_CT(UM): 503
OD_CT(UM): 525

## 2024-05-06 ASSESSMENT — VISUAL ACUITY
CORRECTION_TYPE: GLASSES
OS_CC+: -2
OS_CC: 20/40
OD_CC: 20/25-1
METHOD: SNELLEN - LINEAR
OS_CC: 20/20
OD_CC: 20/20

## 2024-05-06 ASSESSMENT — REFRACTION_WEARINGRX
OD_ADD: +2.50
OD_SPHERE: -7.00
OS_SPHERE: -8.25
OS_ADD: +2.50
OS_CYLINDER: +4.00
OD_CYLINDER: +3.50
OD_AXIS: 020
OS_AXIS: 090

## 2024-05-06 ASSESSMENT — EXTERNAL EXAM - LEFT EYE: OS_EXAM: NORMAL

## 2024-05-06 ASSESSMENT — SLIT LAMP EXAM - LIDS
COMMENTS: NORMAL
COMMENTS: NORMAL

## 2024-05-06 ASSESSMENT — EXTERNAL EXAM - RIGHT EYE: OD_EXAM: NORMAL

## 2024-05-06 NOTE — PROGRESS NOTES
Pt is healing well, but she has been seeing the left eye moving up when looking at mirror and had an episode of double/blurred vision when looking at music in a performance. I could not reproduce any misalignment today aside from 2PD of X, but encouraged her to try to record the deviation if it comes up at home as the symptoms happening more towards the end of the day. Gave updated Rx for glasses (same manifest refraction (MR) from Dr Kemp's assessment, but increased the add as well).   F/u in 3-4 months sooner prn.

## 2024-07-08 DIAGNOSIS — Z12.39 ENCOUNTER FOR SCREENING FOR MALIGNANT NEOPLASM OF BREAST, UNSPECIFIED SCREENING MODALITY: ICD-10-CM

## 2024-08-06 ENCOUNTER — APPOINTMENT (OUTPATIENT)
Dept: OPHTHALMOLOGY | Facility: CLINIC | Age: 59
End: 2024-08-06
Payer: OTHER GOVERNMENT

## 2024-08-06 DIAGNOSIS — H52.203 ASTIGMATISM OF BOTH EYES, UNSPECIFIED TYPE: ICD-10-CM

## 2024-08-06 DIAGNOSIS — H49.12 PARALYTIC STRABISMUS ASSOCIATED WITH LEFT TROCHLEAR NERVE PALSY: Primary | ICD-10-CM

## 2024-08-06 DIAGNOSIS — H53.2 DIPLOPIA: ICD-10-CM

## 2024-08-06 DIAGNOSIS — H52.13 HIGH MYOPIA, BILATERAL: ICD-10-CM

## 2024-08-06 PROCEDURE — 99213 OFFICE O/P EST LOW 20 MIN: CPT | Performed by: OPHTHALMOLOGY

## 2024-08-06 PROCEDURE — 92060 SENSORIMOTOR EXAMINATION: CPT | Performed by: OPHTHALMOLOGY

## 2024-08-06 ASSESSMENT — ENCOUNTER SYMPTOMS
EYES NEGATIVE: 1
MUSCULOSKELETAL NEGATIVE: 0
RESPIRATORY NEGATIVE: 0
GASTROINTESTINAL NEGATIVE: 0
DEPRESSION: 0
OCCASIONAL FEELINGS OF UNSTEADINESS: 0
HEMATOLOGIC/LYMPHATIC NEGATIVE: 0
ENDOCRINE NEGATIVE: 0
CONSTITUTIONAL NEGATIVE: 0
LOSS OF SENSATION IN FEET: 0
CARDIOVASCULAR NEGATIVE: 0
PSYCHIATRIC NEGATIVE: 0
NEUROLOGICAL NEGATIVE: 0
ALLERGIC/IMMUNOLOGIC NEGATIVE: 0

## 2024-08-06 ASSESSMENT — VISUAL ACUITY
OD_CC: 20/15
METHOD: SNELLEN - LINEAR
OS_CC: 20/20 TESTED FIRST
OS_CC+: -2
OD_CC+: -1
CORRECTION_TYPE: GLASSES

## 2024-08-06 ASSESSMENT — REFRACTION_WEARINGRX
OS_AXIS: 090
OD_SPHERE: -7.25
OS_SPHERE: -8.25
OD_CYLINDER: +3.50
OD_AXIS: 100
OS_CYLINDER: +3.75
OD_ADD: +2.50
OS_ADD: +2.50

## 2024-08-06 ASSESSMENT — CONF VISUAL FIELD: COMMENTS: NOT ASSESSED

## 2024-08-06 ASSESSMENT — PACHYMETRY
OD_CT(UM): 525
OS_CT(UM): 503

## 2024-08-06 ASSESSMENT — EXTERNAL EXAM - LEFT EYE: OS_EXAM: NORMAL

## 2024-08-06 ASSESSMENT — SLIT LAMP EXAM - LIDS
COMMENTS: NORMAL
COMMENTS: NORMAL

## 2024-08-06 ASSESSMENT — EXTERNAL EXAM - RIGHT EYE: OD_EXAM: NORMAL

## 2024-08-06 NOTE — PROGRESS NOTES
Pt with mild exophoria and monocular blurred vision OD. I suspect dry eyes is a factor, as her VA got better (20/15) with current glasses. Advise to f/u with Dr Kemp. I will see 6-9 months or prn.

## 2024-08-16 ENCOUNTER — OFFICE VISIT (OUTPATIENT)
Dept: PRIMARY CARE | Facility: CLINIC | Age: 59
End: 2024-08-16
Payer: OTHER GOVERNMENT

## 2024-08-16 VITALS
DIASTOLIC BLOOD PRESSURE: 52 MMHG | SYSTOLIC BLOOD PRESSURE: 112 MMHG | TEMPERATURE: 97.7 F | RESPIRATION RATE: 16 BRPM | BODY MASS INDEX: 24.12 KG/M2 | HEIGHT: 65 IN | HEART RATE: 63 BPM | OXYGEN SATURATION: 96 % | WEIGHT: 144.8 LBS

## 2024-08-16 DIAGNOSIS — L01.00 IMPETIGO: ICD-10-CM

## 2024-08-16 DIAGNOSIS — Z12.39 ENCOUNTER FOR BREAST CANCER SCREENING USING NON-MAMMOGRAM MODALITY: Primary | ICD-10-CM

## 2024-08-16 DIAGNOSIS — S16.1XXA STRAIN OF NECK MUSCLE, INITIAL ENCOUNTER: ICD-10-CM

## 2024-08-16 DIAGNOSIS — G43.109 MIGRAINE WITH AURA AND WITHOUT STATUS MIGRAINOSUS, NOT INTRACTABLE: ICD-10-CM

## 2024-08-16 DIAGNOSIS — E55.9 VITAMIN D DEFICIENCY: ICD-10-CM

## 2024-08-16 DIAGNOSIS — B37.31 YEAST VAGINITIS: ICD-10-CM

## 2024-08-16 DIAGNOSIS — R53.83 FATIGUE, UNSPECIFIED TYPE: ICD-10-CM

## 2024-08-16 DIAGNOSIS — B35.1 ONYCHOMYCOSIS: ICD-10-CM

## 2024-08-16 PROCEDURE — 99214 OFFICE O/P EST MOD 30 MIN: CPT | Performed by: FAMILY MEDICINE

## 2024-08-16 RX ORDER — CEFDINIR 300 MG/1
300 CAPSULE ORAL 2 TIMES DAILY
Qty: 20 CAPSULE | Refills: 0 | Status: SHIPPED | OUTPATIENT
Start: 2024-08-16 | End: 2024-08-26

## 2024-08-16 RX ORDER — BUTALBITAL, ACETAMINOPHEN AND CAFFEINE 50; 325; 40 MG/1; MG/1; MG/1
1 TABLET ORAL EVERY 6 HOURS PRN
Qty: 30 TABLET | Refills: 0 | Status: SHIPPED | OUTPATIENT
Start: 2024-08-16 | End: 2024-09-15

## 2024-08-16 RX ORDER — TERBINAFINE HYDROCHLORIDE 250 MG/1
250 TABLET ORAL DAILY
Qty: 30 TABLET | Refills: 2 | Status: SHIPPED | OUTPATIENT
Start: 2024-08-16

## 2024-08-16 RX ORDER — FLUCONAZOLE 150 MG/1
150 TABLET ORAL EVERY OTHER DAY
Qty: 2 TABLET | Refills: 3 | Status: SHIPPED | OUTPATIENT
Start: 2024-08-16

## 2024-08-16 RX ORDER — PREDNISONE 10 MG/1
TABLET ORAL
Qty: 30 TABLET | Refills: 0 | Status: SHIPPED | OUTPATIENT
Start: 2024-08-16

## 2024-08-16 ASSESSMENT — ENCOUNTER SYMPTOMS
ABDOMINAL DISTENTION: 0
CHEST TIGHTNESS: 0
COUGH: 0
SHORTNESS OF BREATH: 0
TROUBLE SWALLOWING: 0
POLYPHAGIA: 0
PALPITATIONS: 0
CONFUSION: 0
CONSTIPATION: 0
NERVOUS/ANXIOUS: 0
SLEEP DISTURBANCE: 0
EYE PAIN: 0
FEVER: 0
SORE THROAT: 0
CONSTITUTIONAL NEGATIVE: 1
DECREASED CONCENTRATION: 0
COLOR CHANGE: 0
STRIDOR: 0
PHOTOPHOBIA: 0
SPEECH DIFFICULTY: 0
FLANK PAIN: 0
SINUS PAIN: 0
DYSURIA: 0
ACTIVITY CHANGE: 0
POLYDIPSIA: 0
SEIZURES: 0
ABDOMINAL PAIN: 0
AGITATION: 0
RECTAL PAIN: 0
MYALGIAS: 0
ADENOPATHY: 0
DYSPHORIC MOOD: 0
HEMATURIA: 0
FATIGUE: 0
RHINORRHEA: 0
SINUS PRESSURE: 0
DIZZINESS: 0
BLOOD IN STOOL: 0
HEADACHES: 1
APPETITE CHANGE: 0
NECK PAIN: 1
DIARRHEA: 0
ARTHRALGIAS: 0
NECK STIFFNESS: 0

## 2024-08-16 NOTE — PROGRESS NOTES
Subjective   Patient ID: Tiffanie Maldonado is a 58 y.o. female who presents for Annual Exam.    HPI   Patient is at the office for an annual exam also to discuss recent fall on 8/2/24. Patient reports she fell while walking thru a parking blot, she fell forward and then ended up laying on her back. She reports hitting her hear and now she is concerned due that she is having headaches and neck pain.    Patient reports discoloration on toe nails. Patient reports she was medication prescribed by Dr. Hart. She would need refill for medication today.    Patient reports scab on her nose that concerns her. Patient pulled a hair and after this she developed a scab that is getting worst everyday.     Patient would like to discuss possible prescription of medication to manage headaches.     Patient would like to get paper scripts in case Pharmacy can not provide her medications    Review of Systems   Constitutional: Negative.  Negative for activity change, appetite change, fatigue and fever.   HENT:  Negative for congestion, dental problem, ear discharge, ear pain, mouth sores, rhinorrhea, sinus pressure, sinus pain, sore throat, tinnitus and trouble swallowing.    Eyes:  Negative for photophobia, pain and visual disturbance.   Respiratory:  Negative for cough, chest tightness, shortness of breath and stridor.    Cardiovascular:  Negative for chest pain and palpitations.   Gastrointestinal:  Negative for abdominal distention, abdominal pain, blood in stool, constipation, diarrhea and rectal pain.   Endocrine: Negative for cold intolerance, heat intolerance, polydipsia, polyphagia and polyuria.   Genitourinary:  Negative for dysuria, flank pain, hematuria and urgency.   Musculoskeletal:  Positive for neck pain. Negative for arthralgias, gait problem, myalgias and neck stiffness.   Skin:  Negative for color change and rash.   Allergic/Immunologic: Negative for environmental allergies and food allergies.   Neurological:   "Positive for headaches. Negative for dizziness, seizures, syncope and speech difficulty.   Hematological:  Negative for adenopathy.   Psychiatric/Behavioral:  Negative for agitation, confusion, decreased concentration, dysphoric mood and sleep disturbance. The patient is not nervous/anxious.      Objective   /52 (BP Location: Left arm, Patient Position: Sitting, BP Cuff Size: Adult)   Pulse 63   Temp 36.5 °C (97.7 °F) (Temporal)   Resp 16   Ht 1.651 m (5' 5\")   Wt 65.7 kg (144 lb 12.8 oz)   SpO2 96%   BMI 24.10 kg/m²     Physical Exam  Constitutional:       Appearance: Normal appearance.   HENT:      Head: Normocephalic and atraumatic.      Right Ear: Tympanic membrane, ear canal and external ear normal.      Left Ear: Tympanic membrane, ear canal and external ear normal.      Nose: Nose normal.      Mouth/Throat:      Mouth: Mucous membranes are moist.      Pharynx: Oropharynx is clear.   Eyes:      Extraocular Movements: Extraocular movements intact.      Conjunctiva/sclera: Conjunctivae normal.      Pupils: Pupils are equal, round, and reactive to light.   Cardiovascular:      Rate and Rhythm: Normal rate and regular rhythm.      Pulses: Normal pulses.      Heart sounds: Normal heart sounds.   Pulmonary:      Effort: Pulmonary effort is normal.      Breath sounds: Normal breath sounds.   Abdominal:      General: Abdomen is flat. Bowel sounds are normal.      Palpations: Abdomen is soft.   Musculoskeletal:         General: Normal range of motion.      Cervical back: Normal range of motion and neck supple.   Skin:     General: Skin is warm and dry.   Neurological:      General: No focal deficit present.      Mental Status: She is alert and oriented to person, place, and time.   Psychiatric:         Mood and Affect: Mood normal.         Behavior: Behavior normal.         Thought Content: Thought content normal.         Judgment: Judgment normal.       Assessment/Plan   Problem List Items Addressed This " Visit             ICD-10-CM    Vitamin D deficiency E55.9    Relevant Orders    Vitamin D 25-Hydroxy,Total (for eval of Vitamin D levels)    Follow Up In [facility] - PCP - Established    Migraine headache G43.909    Relevant Medications    butalbital-acetaminophen-caff -40 mg tablet    Other Relevant Orders    Follow Up [facility] - PCP - Established    Onychomycosis B35.1    Relevant Medications    terbinafine (LamISIL) 250 mg tablet    Other Relevant Orders    Follow Up [facility] - PCP - Established     Other Visit Diagnoses         Codes    Strain of neck muscle, initial encounter     S16.1XXA    Relevant Medications    predniSONE (Deltasone) 10 mg tablet    Other Relevant Orders    Follow Up [facility] - PCP - Established    Impetigo     L01.00    Relevant Medications    cefdinir (Omnicef) 300 mg capsule    fluconazole (Diflucan) 150 mg tablet    Other Relevant Orders    Follow Up [facility] - PCP - Established    Fatigue, unspecified type     R53.83    Relevant Orders    CBC and Auto Differential    Comprehensive Metabolic Panel    Lipid Panel    Follow Up [facility] - PCP - Established    Yeast vaginitis     B37.31    Relevant Medications    fluconazole (Diflucan) 150 mg tablet    Other Relevant Orders    Follow Up [facility] - PCP - Established        MRI breast  screen      Cannot tolerate mammogr    Begin taking Omnicef, Terbinafine and prednisone    continue all current medications and therapy for chronic medical conditions    PT & Ice for cervical strain.     Scribe Attestation  By signing my name below, IPatience RMA, Scribe   attest that this documentation has been prepared under the direction and in the presence of Thanh Machuca MD.     Provider Attestation - Scribe documentation    All medical record entries made by the Scribe were at my direction and personally dictated by me. I  have reviewed the chart and agree that the record accurately reflects my personal performance of the history, physical exam, discussion and plan.

## 2024-08-26 ENCOUNTER — TELEPHONE (OUTPATIENT)
Dept: PRIMARY CARE | Facility: CLINIC | Age: 59
End: 2024-08-26
Payer: OTHER GOVERNMENT

## 2024-08-26 DIAGNOSIS — Z12.39 ENCOUNTER FOR BREAST CANCER SCREENING USING NON-MAMMOGRAM MODALITY: ICD-10-CM

## 2024-08-26 NOTE — TELEPHONE ENCOUNTER
Patient of DR Machuca    Patient needs a new order in MRI  of breast bilateral with contrast . The code has to be change so  it doesn't say fast screening. She wants to see if her insurance will pick it up .     She has an appointment this Friday at [address] [place] [address]

## 2024-08-30 ENCOUNTER — APPOINTMENT (OUTPATIENT)
Dept: RADIOLOGY | Facility: CLINIC | Age: 59
End: 2024-08-30
Payer: OTHER GOVERNMENT

## 2024-09-27 ENCOUNTER — HOSPITAL ENCOUNTER (OUTPATIENT)
Dept: RADIOLOGY | Facility: CLINIC | Age: 59
Discharge: HOME | End: 2024-09-27
Payer: OTHER GOVERNMENT

## 2024-09-27 DIAGNOSIS — L01.00 IMPETIGO: ICD-10-CM

## 2024-09-27 DIAGNOSIS — R53.83 FATIGUE, UNSPECIFIED TYPE: ICD-10-CM

## 2024-09-27 DIAGNOSIS — B35.1 ONYCHOMYCOSIS: ICD-10-CM

## 2024-09-27 DIAGNOSIS — E55.9 VITAMIN D DEFICIENCY: ICD-10-CM

## 2024-09-27 DIAGNOSIS — S16.1XXA STRAIN OF NECK MUSCLE, INITIAL ENCOUNTER: ICD-10-CM

## 2024-09-27 DIAGNOSIS — G43.109 MIGRAINE WITH AURA AND WITHOUT STATUS MIGRAINOSUS, NOT INTRACTABLE: ICD-10-CM

## 2024-09-27 DIAGNOSIS — Z12.39 ENCOUNTER FOR BREAST CANCER SCREENING USING NON-MAMMOGRAM MODALITY: ICD-10-CM

## 2024-09-27 DIAGNOSIS — B37.31 YEAST VAGINITIS: ICD-10-CM

## 2024-09-27 PROCEDURE — 77049 MRI BREAST C-+ W/CAD BI: CPT

## 2024-09-27 PROCEDURE — 2550000001 HC RX 255 CONTRASTS: Performed by: FAMILY MEDICINE

## 2024-09-27 PROCEDURE — A9575 INJ GADOTERATE MEGLUMI 0.1ML: HCPCS | Performed by: FAMILY MEDICINE

## 2024-09-27 RX ORDER — GADOTERATE MEGLUMINE 376.9 MG/ML
13 INJECTION INTRAVENOUS
Status: COMPLETED | OUTPATIENT
Start: 2024-09-27 | End: 2024-09-27

## 2024-10-15 ENCOUNTER — PATIENT OUTREACH (OUTPATIENT)
Dept: CARE COORDINATION | Facility: CLINIC | Age: 59
End: 2024-10-15
Payer: OTHER GOVERNMENT

## 2024-10-15 RX ORDER — AMOXICILLIN AND CLAVULANATE POTASSIUM 875; 125 MG/1; MG/1
1 TABLET, FILM COATED ORAL EVERY 12 HOURS
COMMUNITY
Start: 2024-10-14 | End: 2024-10-24

## 2024-10-15 NOTE — PROGRESS NOTES
Discharge Facility: [facility]  Discharge Diagnosis: Diverticulitis    Admission Date: 10/12/24  Discharge Date: 10/14/24    PCP Appointment Date: Ag Hart DO 10/17/24  Specialist Appointment Date: [facility]  Hospital Encounter and Summary Linked: Yes  See discharge assessment below for further details     Engagement  Call Start Time: 1538 (10/15/2024  3:41 PM)    Medications  Medications reviewed with patient/caregiver?: Yes (10/15/2024  3:41 PM)  Is the patient having any side effects they believe may be caused by any medication additions or changes?: No (10/15/2024  3:41 PM)  Does the patient have all medications ordered at discharge?: Yes (10/15/2024  3:41 PM)  Care Management Interventions: No intervention needed (10/15/2024  3:41 PM)  Prescription Comments: amoxicillin-clavulanate potassium (AUGMENTIN) 875-125 mg per tablet (10/15/2024  3:41 PM)  Is the patient taking all medications as directed (includes completed medication regime)?: Yes (10/15/2024  3:41 PM)  Care Management Interventions: Provided patient education (10/15/2024  3:41 PM)  Medication Comments: no issues obtaining medications (10/15/2024  3:41 PM)    Appointments  Does the patient have a primary care provider?: Yes (10/15/2024  3:41 PM)  Care Management Interventions: Verified appointment date/time/provider (10/15/2024  3:41 PM)  Has the patient kept scheduled appointments due by today?: Yes (10/15/2024  3:41 PM)  Care Management Interventions: Advised patient to keep appointment (10/15/2024  3:41 PM)    Self Management  What is the home health agency?: denies need (10/15/2024  3:41 PM)  What Durable Medical Equipment (DME) was ordered?: denies need (10/15/2024  3:41 PM)    Patient Teaching  Does the patient have access to their discharge instructions?: Yes (10/15/2024  3:41 PM)  Care Management Interventions: Reviewed instructions with patient; Educated on MyChart (10/15/2024  3:41 PM)  What is the patient's perception of their  health status since discharge?: Improving (10/15/2024  3:41 PM)  Is the patient/caregiver able to teach back the hierarchy of who to call/visit for symptoms/problems? PCP, Specialist, Home Health nurse, Urgent Care, ED, 911: Yes (10/15/2024  3:41 PM)  Patient/Caregiver Education Comments: CM discussed referencing discharge paperwork to follow detailed daily medication schedule (10/15/2024  3:41 PM)    Wrap Up  Wrap Up Additional Comments: This CM spoke with patient via phone. Successful transition of care outreach complete. Pt reports doing well at home since discharge. New meds reviewed. Pt reports she continues to have loose stool and a feeling of fullness in abdomen. Pt began taking prescribed medications this am. Pt also reports having a headache and is able to take prescribed pain medication. Patient denies any further discharge questions/needs at this time. Emphasized that Follow up is needed after discharge to review the hospital recommendations, assess your response to your treatment.  Pt aware of my availability for non-emergent concerns. Contact info provided to patient. (10/15/2024  3:41 PM)

## 2024-10-17 ENCOUNTER — APPOINTMENT (OUTPATIENT)
Dept: PRIMARY CARE | Facility: CLINIC | Age: 59
End: 2024-10-17
Payer: OTHER GOVERNMENT

## 2024-10-17 VITALS
WEIGHT: 142 LBS | TEMPERATURE: 98.1 F | SYSTOLIC BLOOD PRESSURE: 126 MMHG | OXYGEN SATURATION: 96 % | HEART RATE: 73 BPM | DIASTOLIC BLOOD PRESSURE: 82 MMHG | BODY MASS INDEX: 23.66 KG/M2 | HEIGHT: 65 IN | RESPIRATION RATE: 16 BRPM

## 2024-10-17 DIAGNOSIS — K57.92 DIVERTICULITIS: ICD-10-CM

## 2024-10-17 PROBLEM — S93.409A ANKLE SPRAIN: Status: RESOLVED | Noted: 2023-09-29 | Resolved: 2024-10-17

## 2024-10-17 PROBLEM — B37.9 YEAST INFECTION: Status: RESOLVED | Noted: 2023-09-29 | Resolved: 2024-10-17

## 2024-10-17 PROBLEM — M25.532 LEFT WRIST PAIN: Status: RESOLVED | Noted: 2023-09-29 | Resolved: 2024-10-17

## 2024-10-17 PROBLEM — M79.642 PAIN OF LEFT HAND: Status: RESOLVED | Noted: 2023-09-29 | Resolved: 2024-10-17

## 2024-10-17 PROBLEM — M54.2 NECK PAIN: Status: RESOLVED | Noted: 2023-09-29 | Resolved: 2024-10-17

## 2024-10-17 PROCEDURE — 99496 TRANSJ CARE MGMT HIGH F2F 7D: CPT | Performed by: FAMILY MEDICINE

## 2024-10-17 RX ORDER — PRASTERONE 6.5 MG/1
6.5 INSERT VAGINAL
COMMUNITY
Start: 2024-10-09

## 2024-10-17 RX ORDER — LISDEXAMFETAMINE DIMESYLATE 20 MG/1
20 CAPSULE ORAL EVERY MORNING
COMMUNITY
Start: 2024-09-28

## 2024-10-17 ASSESSMENT — PATIENT HEALTH QUESTIONNAIRE - PHQ9
2. FEELING DOWN, DEPRESSED OR HOPELESS: NOT AT ALL
SUM OF ALL RESPONSES TO PHQ9 QUESTIONS 1 AND 2: 0
1. LITTLE INTEREST OR PLEASURE IN DOING THINGS: NOT AT ALL

## 2024-10-17 ASSESSMENT — ENCOUNTER SYMPTOMS
OCCASIONAL FEELINGS OF UNSTEADINESS: 0
LOSS OF SENSATION IN FEET: 0
DEPRESSION: 0

## 2024-10-17 NOTE — PROGRESS NOTES
Subjective   Patient ID: Tiffanie Maldonado is a 58 y.o. female who presents for Hospital Follow-up.    HPI   Pt is within 3 days for Hospital follow up.Pt was admitted 10/12/2024 and was discharged 10/14/2024. Pt was admitted for Diverticulitis.  Patient is at the office today to follow up on Hospital Admission . Patient reports that she visited [facility] on 10/12/24 and she was admitted with dx of diverticulitis. Patient symptoms at the moment was discomfort urinating and abdominal pain.  Abdominal CT and B/W performed at ER.    Patient rep[orts that she got MRI breast exam 9/27/24  Review of Systems  12 Systems have been reviewed as follows.  Constitutional: Fever, weight gain, weight loss, appetite change, night sweats, fatigue, chills.  Eyes : blurry, double vision, vision, loss, tearing, redness, pain, sensitivity to light, glaucoma.  Ears: nose, mouth, and throat: Hearing loss, ringing in the ears, ear pain, nasal congestion, nasal drainage, nosebleeds, mouth, throat, irritation tooth problem.  Cardiovascular: chest pain, pressure, heart racing, palpitations, sweating, leg swelling, high or low blood pressure  Pulmonary: Cough, yellow or green sputum, blood and sputum, shortness of breath, wheezing  Gastrointestinal: Nausea, vomiting, diarrhea, constipation, pain, blood in stool, or vomitus, heartburn, difficulty swallowing  Musculoskeletal: Pain, stiffness, joint, redness or warmth, arthritis, back pain, weakness, muscle wasting, sprain or fracture  Neuro: Weight weakness, dizziness, change in voice, change in taste change in vision, change in hearing, loss, or change of sensation, trouble walking, balance problems coordination problems, shaking, speech problem  Endocrine: cold or heat intolerance, blood sugar problem, weight gain or loss missed periods hot flashes, sweats, change in body hair, change in libido, increased thirst, increased urination  Heme/lymph: Swelling, bleeding, problem anemia,  "bruising, enlarged lymph nodes  Allergic/immunologic: H. plus nasal drip, watery itchy eyes, nasal drainage, immunosuppressed  The above were reviewed and noted negative except as noted in HPI and Problem List.    Objective   /82 (BP Location: Right arm, Patient Position: Sitting, BP Cuff Size: Adult)   Pulse 73   Temp 36.7 °C (98.1 °F) (Temporal)   Resp 16   Ht 1.651 m (5' 5\")   Wt 64.4 kg (142 lb)   SpO2 96%   BMI 23.63 kg/m²     Physical Exam  PHYSICAL EXAM:  Constitutional: Well developed, well nourished, alert and in no acute distress   Eyes: Normal external exam. Pupils equally round and reactive to light with normal accommodation and extraocular movements intact.  Neck: Supple, no lymphadenopathy or masses.   Cardiovascular: Regular rate and rhythm, normal S1 and S2, no murmurs, gallops, or rubs. Radial pulses normal. No peripheral edema.  Abdomen: soft, non tender, non distended, no masses or HSM.   Pulmonary: No respiratory distress, lungs clear to auscultation bilaterally. No wheezes, rhonchi, rales.  Skin: Warm, well perfused, normal skin turgor and color.   Neurologic: Cranial nerves II-XII grossly intact.   Psychiatric: Mood calm and affect normal      Assessment/Plan   Problem List Items Addressed This Visit    None  Scribe Attestation  By signing my name below, Marli KAPLAN Scribe   attest that this documentation has been prepared under the direction and in the presence of Ag Hart DO.   Provider Attestation - Scribe documentation    All medical record entries made by the Scribe were at my direction and personally dictated by me. I have reviewed the chart and agree that the record accurately reflects my personal performance of the history, physical exam, discussion and plan.        "

## 2024-10-22 ENCOUNTER — PATIENT OUTREACH (OUTPATIENT)
Dept: PRIMARY CARE | Facility: CLINIC | Age: 59
End: 2024-10-22
Payer: OTHER GOVERNMENT

## 2024-11-12 ENCOUNTER — PATIENT OUTREACH (OUTPATIENT)
Dept: PRIMARY CARE | Facility: CLINIC | Age: 59
End: 2024-11-12
Payer: OTHER GOVERNMENT

## 2024-11-19 ENCOUNTER — APPOINTMENT (OUTPATIENT)
Dept: PRIMARY CARE | Facility: CLINIC | Age: 59
End: 2024-11-19
Payer: OTHER GOVERNMENT

## 2024-11-21 ENCOUNTER — APPOINTMENT (OUTPATIENT)
Dept: PRIMARY CARE | Facility: CLINIC | Age: 59
End: 2024-11-21
Payer: OTHER GOVERNMENT

## 2024-11-27 ENCOUNTER — APPOINTMENT (OUTPATIENT)
Dept: PRIMARY CARE | Facility: CLINIC | Age: 59
End: 2024-11-27
Payer: OTHER GOVERNMENT

## 2024-11-27 VITALS
RESPIRATION RATE: 16 BRPM | DIASTOLIC BLOOD PRESSURE: 64 MMHG | OXYGEN SATURATION: 96 % | SYSTOLIC BLOOD PRESSURE: 112 MMHG | HEART RATE: 67 BPM | BODY MASS INDEX: 23.39 KG/M2 | WEIGHT: 140.4 LBS | HEIGHT: 65 IN

## 2024-11-27 DIAGNOSIS — R06.09 DYSPNEA ON EXERTION: ICD-10-CM

## 2024-11-27 DIAGNOSIS — K57.92 DIVERTICULITIS: Primary | ICD-10-CM

## 2024-11-27 DIAGNOSIS — R06.2 WHEEZING: ICD-10-CM

## 2024-11-27 DIAGNOSIS — K58.0 IRRITABLE BOWEL SYNDROME WITH DIARRHEA: ICD-10-CM

## 2024-11-27 DIAGNOSIS — J40 BRONCHITIS: ICD-10-CM

## 2024-11-27 PROCEDURE — 99214 OFFICE O/P EST MOD 30 MIN: CPT | Performed by: FAMILY MEDICINE

## 2024-11-27 RX ORDER — ALBUTEROL SULFATE 90 UG/1
2 INHALANT RESPIRATORY (INHALATION) EVERY 4 HOURS PRN
Qty: 6.7 G | Refills: 0 | Status: SHIPPED | OUTPATIENT
Start: 2024-11-27 | End: 2025-11-27

## 2024-11-27 RX ORDER — METRONIDAZOLE 500 MG/1
500 TABLET ORAL 2 TIMES DAILY
Qty: 20 TABLET | Refills: 0 | Status: SHIPPED | OUTPATIENT
Start: 2024-11-27 | End: 2024-12-07

## 2024-11-27 RX ORDER — TRAMADOL HYDROCHLORIDE 50 MG/1
TABLET ORAL
COMMUNITY
Start: 2024-10-20 | End: 2024-11-27 | Stop reason: SDUPTHER

## 2024-11-27 RX ORDER — FLUCONAZOLE 200 MG/1
1 TABLET ORAL
COMMUNITY
Start: 2024-11-04

## 2024-11-27 RX ORDER — CIPROFLOXACIN 500 MG/1
500 TABLET ORAL 2 TIMES DAILY
Qty: 20 TABLET | Refills: 0 | Status: SHIPPED | OUTPATIENT
Start: 2024-11-27 | End: 2024-12-07

## 2024-11-27 RX ORDER — BENZONATATE 200 MG/1
200 CAPSULE ORAL 3 TIMES DAILY PRN
Qty: 20 CAPSULE | Refills: 2 | Status: SHIPPED | OUTPATIENT
Start: 2024-11-27

## 2024-11-27 RX ORDER — TRAMADOL HYDROCHLORIDE 50 MG/1
50 TABLET ORAL EVERY 6 HOURS PRN
Qty: 20 TABLET | Refills: 0 | Status: SHIPPED | OUTPATIENT
Start: 2024-11-27

## 2024-11-27 ASSESSMENT — ENCOUNTER SYMPTOMS
RHINORRHEA: 0
NECK STIFFNESS: 0
POLYPHAGIA: 0
DIZZINESS: 0
SHORTNESS OF BREATH: 0
COUGH: 0
MYALGIAS: 0
AGITATION: 0
HEADACHES: 0
TROUBLE SWALLOWING: 0
ARTHRALGIAS: 0
CONSTIPATION: 0
POLYDIPSIA: 0
SINUS PRESSURE: 0
FLANK PAIN: 0
SLEEP DISTURBANCE: 0
DYSPHORIC MOOD: 0
FEVER: 0
NERVOUS/ANXIOUS: 0
ACTIVITY CHANGE: 0
RECTAL PAIN: 0
BLOOD IN STOOL: 0
EYE PAIN: 0
SINUS PAIN: 0
STRIDOR: 0
ABDOMINAL DISTENTION: 0
HEMATURIA: 0
CONSTITUTIONAL NEGATIVE: 1
SPEECH DIFFICULTY: 0
DIARRHEA: 0
PALPITATIONS: 0
CHEST TIGHTNESS: 0
FATIGUE: 0
SEIZURES: 0
APPETITE CHANGE: 0
CONFUSION: 0
ABDOMINAL PAIN: 0
PHOTOPHOBIA: 0
ADENOPATHY: 0
SORE THROAT: 0
DECREASED CONCENTRATION: 0
DYSURIA: 0
COLOR CHANGE: 0

## 2024-11-27 NOTE — PROGRESS NOTES
Subjective   Patient ID: Tiffanie Maldonado is a 59 y.o. female who presents for Nasal Congestion, Nail Problem, and Diverticulitis.    HPI   Patient is following up diverticulitis and had CT abd pelvis screening via [facility]. Patient does have general surgeon appt on 12/9/24 . She would like to discuss and review imaging to confirm diagnosis. She was told she also has a cyst. Area is painful at times.     Patient is having pain currently. Diarrhea is resolved but stool is soft.     Patient is having nasal congestion.     Patient is following up on nail problem      Review of Systems   Constitutional: Negative.  Negative for activity change, appetite change, fatigue and fever.   HENT:  Negative for congestion, dental problem, ear discharge, ear pain, mouth sores, rhinorrhea, sinus pressure, sinus pain, sore throat, tinnitus and trouble swallowing.    Eyes:  Negative for photophobia, pain and visual disturbance.   Respiratory:  Negative for cough, chest tightness, shortness of breath and stridor.    Cardiovascular:  Negative for chest pain and palpitations.   Gastrointestinal:  Negative for abdominal distention, abdominal pain, blood in stool, constipation, diarrhea and rectal pain.   Endocrine: Negative for cold intolerance, heat intolerance, polydipsia, polyphagia and polyuria.   Genitourinary:  Negative for dysuria, flank pain, hematuria and urgency.   Musculoskeletal:  Negative for arthralgias, gait problem, myalgias and neck stiffness.   Skin:  Negative for color change and rash.   Allergic/Immunologic: Negative for environmental allergies and food allergies.   Neurological:  Negative for dizziness, seizures, syncope, speech difficulty and headaches.   Hematological:  Negative for adenopathy.   Psychiatric/Behavioral:  Negative for agitation, confusion, decreased concentration, dysphoric mood and sleep disturbance. The patient is not nervous/anxious.        Objective   /64 (BP Location:  "Right arm, Patient Position: Sitting, BP Cuff Size: Adult)   Pulse 67   Resp 16   Ht 1.651 m (5' 5\")   Wt 63.7 kg (140 lb 6.4 oz)   SpO2 96%   BMI 23.36 kg/m²     Physical Exam  Constitutional: Well developed, well nourished, alert and in no acute distress   Eyes: Normal external exam. Pupils equally round and reactive to light with normal accommodation and extraocular movements intact.  Neck: Supple, no lymphadenopathy or masses.   Cardiovascular: Regular rate and rhythm, normal S1 and S2, no murmurs, gallops, or rubs. Radial pulses normal. No peripheral edema.  Pulmonary: No respiratory distress, lungs clear to auscultation bilaterally. No wheezes, rhonchi, rales.  Abdomen: soft,non tender, non distended, without masses or HSM  Skin: Warm, well perfused, normal skin turgor and color.   Neurologic: Cranial nerves II-XII grossly intact.   Psychiatric: Mood calm and affect normal  Musculoskeletal: Moving all extremities without restriction    Assessment/Plan   Problem List Items Addressed This Visit             ICD-10-CM    IBS (irritable bowel syndrome) K58.9    Relevant Medications    traMADol (Ultram) 50 mg tablet    Other Relevant Orders    Follow Up In [facility] - PCP - Established    Wheezing R06.2    Relevant Medications    albuterol (Proventil HFA) 90 mcg/actuation inhaler    Other Relevant Orders    Follow Up In [facility] - PCP - Established    Diverticulitis - Primary K57.92    Relevant Medications    metroNIDAZOLE (Flagyl) 500 mg tablet    ciprofloxacin (Cipro) 500 mg tablet    Other Relevant Orders    Follow Up In [facility] - PCP - Established     Other Visit Diagnoses         Codes    Dyspnea on exertion     R06.09    Relevant Medications    albuterol (Proventil HFA) 90 mcg/actuation inhaler    Other Relevant Orders    Follow Up In [facility] - PCP - Established    Bronchitis     J40    Relevant Medications    benzonatate (Tessalon) 200 mg capsule    " Other Relevant Orders    Follow Up In [facility] - PCP - Established        Cipro & flagyl    Continue current medications and therapy for chronic medical conditions    MRI breast  screen      Cannot tolerate mammogr     continue all current medications and therapy for chronic medical conditions     PT & Ice for cervical strain.     See CT

## 2024-12-06 ENCOUNTER — APPOINTMENT (OUTPATIENT)
Dept: PRIMARY CARE | Facility: CLINIC | Age: 59
End: 2024-12-06
Payer: OTHER GOVERNMENT

## 2024-12-06 VITALS
SYSTOLIC BLOOD PRESSURE: 98 MMHG | OXYGEN SATURATION: 97 % | HEART RATE: 62 BPM | WEIGHT: 143 LBS | DIASTOLIC BLOOD PRESSURE: 64 MMHG | BODY MASS INDEX: 23.8 KG/M2

## 2024-12-06 DIAGNOSIS — K57.92 DIVERTICULITIS: ICD-10-CM

## 2024-12-06 DIAGNOSIS — G43.109 MIGRAINE WITH AURA AND WITHOUT STATUS MIGRAINOSUS, NOT INTRACTABLE: Primary | ICD-10-CM

## 2024-12-06 DIAGNOSIS — J40 BRONCHITIS: ICD-10-CM

## 2024-12-06 DIAGNOSIS — R06.09 DYSPNEA ON EXERTION: ICD-10-CM

## 2024-12-06 DIAGNOSIS — K58.0 IRRITABLE BOWEL SYNDROME WITH DIARRHEA: ICD-10-CM

## 2024-12-06 DIAGNOSIS — R06.2 WHEEZING: ICD-10-CM

## 2024-12-06 PROCEDURE — 99214 OFFICE O/P EST MOD 30 MIN: CPT | Performed by: FAMILY MEDICINE

## 2024-12-06 PROCEDURE — 1036F TOBACCO NON-USER: CPT | Performed by: FAMILY MEDICINE

## 2024-12-06 RX ORDER — CIPROFLOXACIN 500 MG/1
500 TABLET ORAL 2 TIMES DAILY
Qty: 20 TABLET | Refills: 0 | Status: SHIPPED | OUTPATIENT
Start: 2024-12-06 | End: 2024-12-16

## 2024-12-06 RX ORDER — BUTALBITAL, ACETAMINOPHEN AND CAFFEINE 50; 325; 40 MG/1; MG/1; MG/1
1 TABLET ORAL EVERY 4 HOURS PRN
Qty: 30 TABLET | Refills: 0 | Status: SHIPPED | OUTPATIENT
Start: 2024-12-06 | End: 2025-08-03

## 2024-12-06 RX ORDER — CEFDINIR 300 MG/1
300 CAPSULE ORAL 2 TIMES DAILY
Qty: 20 CAPSULE | Refills: 0 | Status: SHIPPED | OUTPATIENT
Start: 2024-12-06 | End: 2024-12-16

## 2024-12-06 ASSESSMENT — ENCOUNTER SYMPTOMS
SINUS PRESSURE: 0
DIZZINESS: 0
PHOTOPHOBIA: 0
ABDOMINAL DISTENTION: 0
FLANK PAIN: 0
RECTAL PAIN: 0
STRIDOR: 0
MYALGIAS: 0
NERVOUS/ANXIOUS: 0
COUGH: 0
APPETITE CHANGE: 0
EYE PAIN: 0
POLYDIPSIA: 0
SLEEP DISTURBANCE: 0
ABDOMINAL PAIN: 0
HEADACHES: 0
ADENOPATHY: 0
FEVER: 0
BLOOD IN STOOL: 0
CONFUSION: 0
NECK STIFFNESS: 0
DIARRHEA: 0
CONSTITUTIONAL NEGATIVE: 1
PALPITATIONS: 0
DYSURIA: 0
TROUBLE SWALLOWING: 0
SPEECH DIFFICULTY: 0
SHORTNESS OF BREATH: 0
DECREASED CONCENTRATION: 0
SORE THROAT: 0
COLOR CHANGE: 0
FATIGUE: 0
ACTIVITY CHANGE: 0
HEMATURIA: 0
ARTHRALGIAS: 0
CONSTIPATION: 0
RHINORRHEA: 0
SINUS PAIN: 0
AGITATION: 0
SEIZURES: 0
POLYPHAGIA: 0
CHEST TIGHTNESS: 0
DYSPHORIC MOOD: 0

## 2024-12-17 ENCOUNTER — APPOINTMENT (OUTPATIENT)
Dept: PRIMARY CARE | Facility: CLINIC | Age: 59
End: 2024-12-17
Payer: OTHER GOVERNMENT

## 2024-12-17 VITALS
BODY MASS INDEX: 23.32 KG/M2 | HEART RATE: 66 BPM | SYSTOLIC BLOOD PRESSURE: 110 MMHG | RESPIRATION RATE: 16 BRPM | DIASTOLIC BLOOD PRESSURE: 70 MMHG | HEIGHT: 65 IN | WEIGHT: 140 LBS | TEMPERATURE: 97 F | OXYGEN SATURATION: 99 %

## 2024-12-17 DIAGNOSIS — K57.92 DIVERTICULITIS: ICD-10-CM

## 2024-12-17 DIAGNOSIS — R06.2 WHEEZING: ICD-10-CM

## 2024-12-17 DIAGNOSIS — K58.0 IRRITABLE BOWEL SYNDROME WITH DIARRHEA: ICD-10-CM

## 2024-12-17 DIAGNOSIS — J40 BRONCHITIS: ICD-10-CM

## 2024-12-17 DIAGNOSIS — R06.09 DYSPNEA ON EXERTION: ICD-10-CM

## 2024-12-17 PROCEDURE — 99214 OFFICE O/P EST MOD 30 MIN: CPT | Performed by: FAMILY MEDICINE

## 2024-12-17 RX ORDER — CEFDINIR 300 MG/1
300 CAPSULE ORAL 2 TIMES DAILY
Qty: 20 CAPSULE | Refills: 0 | Status: SHIPPED | OUTPATIENT
Start: 2024-12-17 | End: 2024-12-27

## 2024-12-17 RX ORDER — CIPROFLOXACIN 500 MG/1
500 TABLET ORAL 2 TIMES DAILY
Qty: 20 TABLET | Refills: 0 | Status: SHIPPED | OUTPATIENT
Start: 2024-12-17 | End: 2024-12-27

## 2024-12-17 ASSESSMENT — ENCOUNTER SYMPTOMS
BRUISES/BLEEDS EASILY: 0
WHEEZING: 0
ARTHRALGIAS: 0
EYE PAIN: 0
COUGH: 0
CARDIOVASCULAR NEGATIVE: 1
RHINORRHEA: 0
BLOOD IN STOOL: 0
DIZZINESS: 0
POLYPHAGIA: 0
DIAPHORESIS: 0
ABDOMINAL DISTENTION: 0
NECK STIFFNESS: 0
DECREASED CONCENTRATION: 0
ACTIVITY CHANGE: 0
RECTAL PAIN: 0
ANAL BLEEDING: 0
FEVER: 0
SORE THROAT: 0
DIFFICULTY URINATING: 0
HALLUCINATIONS: 0
FREQUENCY: 0
SINUS PRESSURE: 0
APPETITE CHANGE: 0
DIARRHEA: 0
MYALGIAS: 0
AGITATION: 0
SPEECH DIFFICULTY: 0
CONSTITUTIONAL NEGATIVE: 1
TREMORS: 0
COLOR CHANGE: 0
POLYDIPSIA: 0
CHEST TIGHTNESS: 0
PHOTOPHOBIA: 0
EYE DISCHARGE: 0
UNEXPECTED WEIGHT CHANGE: 0
HEMATURIA: 0
ADENOPATHY: 0
CHOKING: 0
BACK PAIN: 0
EYE ITCHING: 0
FACIAL SWELLING: 0
CONSTIPATION: 0
HEADACHES: 0
LIGHT-HEADEDNESS: 0
JOINT SWELLING: 0
ABDOMINAL PAIN: 1
VOICE CHANGE: 0
NECK PAIN: 0
CONFUSION: 0
EYE REDNESS: 0
SINUS PAIN: 0
FACIAL ASYMMETRY: 0
WOUND: 0
STRIDOR: 0
PALPITATIONS: 0
SHORTNESS OF BREATH: 0
SEIZURES: 0
HYPERACTIVE: 0
APNEA: 0
CHILLS: 0
TROUBLE SWALLOWING: 0
DYSPHORIC MOOD: 0
VOMITING: 0
WEAKNESS: 0
NAUSEA: 0
NERVOUS/ANXIOUS: 0
DYSURIA: 0
FATIGUE: 0
SLEEP DISTURBANCE: 0
NUMBNESS: 0

## 2024-12-17 NOTE — PROGRESS NOTES
Subjective   Patient ID: Tiffanie Maldonado is a 59 y.o. female who presents for Diverticulitis.    Patient presents today for follow up on her Diverticulitis, She still complains of abdominal pain and and rib discomfort. She feels like the pain is worse depending on what kind of foods she eats.          Review of Systems   Constitutional: Negative.  Negative for activity change, appetite change, chills, diaphoresis, fatigue, fever and unexpected weight change.   HENT: Negative.  Negative for congestion, dental problem, ear discharge, facial swelling, hearing loss, mouth sores, nosebleeds, postnasal drip, rhinorrhea, sinus pressure, sinus pain, sneezing, sore throat, tinnitus, trouble swallowing and voice change.    Eyes:  Negative for photophobia, pain, discharge, redness, itching and visual disturbance.   Respiratory:  Negative for apnea, cough, choking, chest tightness, shortness of breath, wheezing and stridor.    Cardiovascular: Negative.  Negative for chest pain, palpitations and leg swelling.   Gastrointestinal:  Positive for abdominal pain. Negative for abdominal distention, anal bleeding, blood in stool, constipation, diarrhea, nausea, rectal pain and vomiting.   Endocrine: Negative for cold intolerance, heat intolerance, polydipsia, polyphagia and polyuria.   Genitourinary:  Negative for decreased urine volume, difficulty urinating, dysuria, enuresis, frequency, hematuria and urgency.   Musculoskeletal:  Negative for arthralgias, back pain, gait problem, joint swelling, myalgias, neck pain and neck stiffness.   Skin:  Negative for color change, pallor, rash and wound.   Allergic/Immunologic: Negative for environmental allergies, food allergies and immunocompromised state.   Neurological:  Negative for dizziness, tremors, seizures, syncope, facial asymmetry, speech difficulty, weakness, light-headedness, numbness and headaches.   Hematological:  Negative for adenopathy. Does not bruise/bleed easily.  "  Psychiatric/Behavioral:  Negative for agitation, behavioral problems, confusion, decreased concentration, dysphoric mood, hallucinations, self-injury, sleep disturbance and suicidal ideas. The patient is not nervous/anxious and is not hyperactive.    All other systems reviewed and are negative.    Objective   /70 (BP Location: Left arm, Patient Position: Sitting, BP Cuff Size: Adult)   Pulse 66   Temp 36.1 °C (97 °F) (Temporal)   Resp 16   Ht 1.651 m (5' 5\")   Wt 63.5 kg (140 lb)   SpO2 99%   BMI 23.30 kg/m²     Physical Exam  Vitals reviewed.   Constitutional:       General: She is not in acute distress.     Appearance: Normal appearance. She is normal weight. She is not ill-appearing or diaphoretic.   HENT:      Head: Normocephalic.      Right Ear: Tympanic membrane and external ear normal.      Left Ear: Tympanic membrane and external ear normal.      Nose: Nose normal. No congestion.      Mouth/Throat:      Pharynx: No posterior oropharyngeal erythema.   Eyes:      General:         Right eye: No discharge.         Left eye: No discharge.      Extraocular Movements: Extraocular movements intact.      Conjunctiva/sclera: Conjunctivae normal.      Pupils: Pupils are equal, round, and reactive to light.   Cardiovascular:      Rate and Rhythm: Normal rate and regular rhythm.      Pulses: Normal pulses.      Heart sounds: Normal heart sounds. No murmur heard.  Pulmonary:      Effort: Pulmonary effort is normal. No respiratory distress.      Breath sounds: Normal breath sounds. No wheezing or rales.   Chest:      Chest wall: No tenderness.   Abdominal:      General: Abdomen is flat. Bowel sounds are normal. There is no distension.      Palpations: There is no mass.      Tenderness: There is no abdominal tenderness. There is no guarding.   Musculoskeletal:         General: No tenderness. Normal range of motion.      Cervical back: Normal range of motion and neck supple. No tenderness.      Right lower " leg: No edema.      Left lower leg: No edema.   Skin:     General: Skin is dry.      Coloration: Skin is not jaundiced.      Findings: No bruising, erythema or rash.   Neurological:      General: No focal deficit present.      Mental Status: She is alert and oriented to person, place, and time. Mental status is at baseline.      Cranial Nerves: No cranial nerve deficit.      Sensory: No sensory deficit.      Coordination: Coordination normal.      Gait: Gait normal.   Psychiatric:         Mood and Affect: Mood normal.         Thought Content: Thought content normal.         Judgment: Judgment normal.         Assessment/Plan   Problem List Items Addressed This Visit             ICD-10-CM    IBS (irritable bowel syndrome) K58.9    Relevant Medications    ciprofloxacin (Cipro) 500 mg tablet    cefdinir (Omnicef) 300 mg capsule    Other Relevant Orders    Follow Up In [facility] - PCP - Established    Wheezing R06.2    Relevant Orders    Follow Up In [facility] - PCP - Established    Diverticulitis K57.92    Relevant Orders    Follow Up In [facility] - PCP - Established     Other Visit Diagnoses         Codes    Dyspnea on exertion     R06.09    Relevant Orders    Follow Up In [facility] - PCP - Established    Bronchitis     J40    Relevant Orders    Follow Up In [facility] - PCP - Established        Scribe Attestation  By signing my name below, ILeny MA, Scribe   attest that this documentation has been prepared under the direction and in the presence of Thanh Machuca MD.    Provider Attestation - Scribe documentation    All medical record entries made by the Scribe were at my direction and personally dictated by me. I have reviewed the chart and agree that the record accurately reflects my personal performance of the history, physical exam, discussion and plan.    Completed flagyl  Repeat treatment with Cipro and start Omnicef  Continued brat  diet    MRI breast  screen      Cannot tolerate mammogr     continue all current medications and therapy for chronic medical conditions     PT & Ice for cervical strain.      See CT      Appt with Gen surgery is on 12/9/24 completed     Improving

## 2024-12-24 DIAGNOSIS — R06.2 WHEEZING: ICD-10-CM

## 2024-12-24 DIAGNOSIS — R06.09 DYSPNEA ON EXERTION: ICD-10-CM

## 2024-12-24 RX ORDER — ALBUTEROL SULFATE 90 UG/1
2 INHALANT RESPIRATORY (INHALATION) EVERY 4 HOURS PRN
Qty: 18 G | Refills: 3 | Status: SHIPPED | OUTPATIENT
Start: 2024-12-24 | End: 2025-12-24

## 2024-12-27 DIAGNOSIS — K58.0 IRRITABLE BOWEL SYNDROME WITH DIARRHEA: ICD-10-CM

## 2024-12-27 NOTE — TELEPHONE ENCOUNTER
Recent Visits  Date Type Provider Dept   12/17/24 Office Visit Thanh Machuca MD [facility]   12/06/24 Office Visit Thanh Machuca MD [facility]   11/27/24 Office Visit Thanh Machuca MD [facility]   10/17/24 Office Visit Ag Hart, DO [facility]   08/16/24 Office Visit Thanh Machuca MD [facility]   Showing recent visits within past 365 days and meeting all other requirements  Future Appointments  Date Type Provider Dept   01/06/25 Appointment Thanh Machuca MD [facility]   Showing future appointments within next 90 days and meeting all other requirements

## 2024-12-27 NOTE — TELEPHONE ENCOUNTER
PT OF [facility]/TRAMADOL    PT IS SEEING DR WHITAKER FOR ABDOMINAL PAIN, AND PT IS CALLING BACK TO REQUEST A REFILL FROM [facility] FOR TRAMADOL    TRAMADOL     [facility] [place]     PT IS SCHED FOR A CT SCAN OF THE ABDOMINAL TODAY

## 2024-12-30 RX ORDER — TRAMADOL HYDROCHLORIDE 50 MG/1
50 TABLET ORAL EVERY 6 HOURS PRN
Qty: 20 TABLET | Refills: 0 | Status: SHIPPED | OUTPATIENT
Start: 2024-12-30

## 2025-01-06 ENCOUNTER — APPOINTMENT (OUTPATIENT)
Dept: PRIMARY CARE | Facility: CLINIC | Age: 60
End: 2025-01-06
Payer: OTHER GOVERNMENT

## 2025-01-07 ENCOUNTER — TELEMEDICINE (OUTPATIENT)
Dept: PRIMARY CARE | Facility: CLINIC | Age: 60
End: 2025-01-07
Payer: OTHER GOVERNMENT

## 2025-01-07 DIAGNOSIS — N30.00 ACUTE CYSTITIS WITHOUT HEMATURIA: Primary | ICD-10-CM

## 2025-01-07 DIAGNOSIS — R06.09 DYSPNEA ON EXERTION: ICD-10-CM

## 2025-01-07 DIAGNOSIS — K56.699 COLON STRICTURE (MULTI): ICD-10-CM

## 2025-01-07 DIAGNOSIS — K57.90 DIVERTICULOSIS: ICD-10-CM

## 2025-01-07 DIAGNOSIS — E55.9 VITAMIN D DEFICIENCY: ICD-10-CM

## 2025-01-07 DIAGNOSIS — K57.92 DIVERTICULITIS: ICD-10-CM

## 2025-01-07 DIAGNOSIS — K58.0 IRRITABLE BOWEL SYNDROME WITH DIARRHEA: ICD-10-CM

## 2025-01-07 DIAGNOSIS — B37.31 YEAST VAGINITIS: ICD-10-CM

## 2025-01-07 DIAGNOSIS — R06.2 WHEEZING: ICD-10-CM

## 2025-01-07 DIAGNOSIS — J40 BRONCHITIS: ICD-10-CM

## 2025-01-07 PROBLEM — T88.53XA AWARENESS UNDER ANESTHESIA: Status: RESOLVED | Noted: 2024-03-19 | Resolved: 2025-01-07

## 2025-01-07 PROCEDURE — 99214 OFFICE O/P EST MOD 30 MIN: CPT | Performed by: FAMILY MEDICINE

## 2025-01-07 ASSESSMENT — PATIENT HEALTH QUESTIONNAIRE - PHQ9
2. FEELING DOWN, DEPRESSED OR HOPELESS: NOT AT ALL
1. LITTLE INTEREST OR PLEASURE IN DOING THINGS: NOT AT ALL
SUM OF ALL RESPONSES TO PHQ9 QUESTIONS 1 AND 2: 0

## 2025-01-07 NOTE — PROGRESS NOTES
Subjective   Patient ID: Tiffanie Maldonado is a 59 y.o. female who presents for Abdominal Pain.    HPI     Patient presents on a tele visit today to follow up on abdominal pain and diverticulitis. Patient states she had a CT scan completed recently at [facility]. Patient states she has been feeling a little better but her abdomen is . Patient states she has been eating smaller meals and has been wearing a pad on her waistband so it does not bother her abdomen. Patient has questions if the abscess is still there. Patient has been more aware of her diet and she states she is not sure where to start to get healthier.    Review of Systems  12 Systems have been reviewed as follows.  Constitutional: Fever, weight gain, weight loss, appetite change, night sweats, fatigue, chills.  Eyes : blurry, double vision, vision, loss, tearing, redness, pain, sensitivity to light, glaucoma.  Ears, nose, mouth, and throat: Hearing loss, ringing in the ears, ear pain, nasal congestion, nasal drainage, nosebleeds, mouth, throat, irritation tooth problem.  Cardiovascular :chest pain, pressure, heart racing, palpitations, sweating, leg swelling, high or low blood pressure  Pulmonary: Cough, yellow or green sputum, blood and sputum, shortness of breath, wheezing  Gastrointestinal: Nausea, vomiting, diarrhea, constipation, pain, blood in stool, or vomitus, heartburn, difficulty swallowing  Genitourinary: incontinence, abnormal bleeding, abnormal discharge, urinary frequency, urinary hesitancy, pain, impotence sexual problem, infection, urinary retention  Musculoskeletal: Pain, stiffness, joint, redness or warmth, arthritis, back pain, weakness, muscle wasting, sprain or fracture  Neuro: Weight weakness, dizziness, change in voice, change in taste change in vision, change in hearing, loss, or change of sensation, trouble walking, balance problems coordination problems, shaking, speech problem  Endocrine , cold or heat  intolerance, blood sugar problem, weight gain or loss missed periods hot flashes, sweats, change in body hair, change in libido, increased thirst, increased urination  Heme/lymph: Swelling, bleeding, problem anemia, bruising, enlarged lymph nodes  Allergic/immunologic: H. plus nasal drip, watery itchy eyes, nasal drainage, immunosuppressed  The above were reviewed and noted negative except as noted in HPI and Problem List.    Objective   There were no vitals taken for this visit.    Physical Exam  Constitutional: Well developed, well nourished, alert and in no acute distress     Assessment/Plan   Problem List Items Addressed This Visit             ICD-10-CM    Vitamin D deficiency E55.9    Relevant Orders    Follow Up In [facility] - PCP - Established    Colon stricture (Multi) K56.699    Relevant Orders    Follow Up In [facility] - PCP - Established    Diverticulosis K57.90    Relevant Orders    Follow Up In [facility] - PCP - Established    IBS (irritable bowel syndrome) K58.9    Relevant Orders    Follow Up In [facility] - PCP - Established    Wheezing R06.2    Relevant Orders    Follow Up In [facility] - PCP - Established    Diverticulitis K57.92    Relevant Orders    Follow Up In [facility] - PCP - Established     Other Visit Diagnoses         Codes    Acute cystitis without hematuria    -  Primary N30.00    Relevant Orders    Follow Up In [facility] - PCP - Established    Dyspnea on exertion     R06.09    Relevant Orders    Follow Up In [facility] - PCP - Established    Bronchitis     J40    Relevant Orders    Follow Up In [facility] - PCP - Established    Yeast vaginitis     B37.31    Relevant Medications    fluconazole (Diflucan) 150 mg tablet    Other Relevant Orders    Follow Up In [facility] - PCP - Established        Continue current medications and therapy for chronic medical  conditions    Virtual Visit - Audio and Visual Communication Real Time     Completed flagyl  Repeat treatment with Cipro and start Omnicef  Continued brat diet     MRI breast  screen      Cannot tolerate mammogr     continue all current medications and therapy for chronic medical conditions     PT & Ice for cervical strain.      See CT      Appt with Gen surgery is on 12/9/24 completed      Improving

## 2025-01-09 ENCOUNTER — TELEPHONE (OUTPATIENT)
Dept: PRIMARY CARE | Facility: CLINIC | Age: 60
End: 2025-01-09

## 2025-01-10 ENCOUNTER — PATIENT OUTREACH (OUTPATIENT)
Dept: PRIMARY CARE | Facility: CLINIC | Age: 60
End: 2025-01-10
Payer: OTHER GOVERNMENT

## 2025-01-10 NOTE — PROGRESS NOTES
Final call. Called and spoke with patient to address any questions or concerns regarding hospitalization.   Patient reports their condition has (returned to baseline). Pt reports Dr Machuca has been keeping the patient on antibiotics. Pt has been on a restricted diet and continues to get better slowly. Pt has also follow up with gastroenterology.  Patient encouraged to keep my contact information in case any needs arise.       .

## 2025-01-14 RX ORDER — NITROFURANTOIN 25; 75 MG/1; MG/1
100 CAPSULE ORAL 2 TIMES DAILY
Qty: 14 CAPSULE | Refills: 0 | Status: SHIPPED | OUTPATIENT
Start: 2025-01-14 | End: 2025-01-21

## 2025-01-14 RX ORDER — FLUCONAZOLE 150 MG/1
150 TABLET ORAL DAILY
Qty: 2 TABLET | Refills: 0 | Status: SHIPPED | OUTPATIENT
Start: 2025-01-14

## 2025-01-22 ENCOUNTER — OFFICE VISIT (OUTPATIENT)
Dept: URGENT CARE | Age: 60
End: 2025-01-22
Payer: OTHER GOVERNMENT

## 2025-01-22 VITALS
HEART RATE: 64 BPM | DIASTOLIC BLOOD PRESSURE: 74 MMHG | WEIGHT: 140 LBS | HEIGHT: 65 IN | BODY MASS INDEX: 23.32 KG/M2 | TEMPERATURE: 97.9 F | OXYGEN SATURATION: 94 % | SYSTOLIC BLOOD PRESSURE: 116 MMHG | RESPIRATION RATE: 20 BRPM

## 2025-01-22 DIAGNOSIS — R05.1 ACUTE COUGH: ICD-10-CM

## 2025-01-22 DIAGNOSIS — J02.9 SORE THROAT: Primary | ICD-10-CM

## 2025-01-22 DIAGNOSIS — J06.9 VIRAL URI: ICD-10-CM

## 2025-01-22 LAB
POC RAPID INFLUENZA A: NEGATIVE
POC RAPID INFLUENZA B: NEGATIVE
POC RAPID STREP: NEGATIVE
POC SARS-COV-2 AG BINAX: NORMAL

## 2025-01-22 PROCEDURE — 87811 SARS-COV-2 COVID19 W/OPTIC: CPT | Performed by: FAMILY MEDICINE

## 2025-01-22 PROCEDURE — 87804 INFLUENZA ASSAY W/OPTIC: CPT | Performed by: FAMILY MEDICINE

## 2025-01-22 PROCEDURE — 1036F TOBACCO NON-USER: CPT | Performed by: FAMILY MEDICINE

## 2025-01-22 PROCEDURE — 3008F BODY MASS INDEX DOCD: CPT | Performed by: FAMILY MEDICINE

## 2025-01-22 PROCEDURE — 99213 OFFICE O/P EST LOW 20 MIN: CPT | Performed by: FAMILY MEDICINE

## 2025-01-22 PROCEDURE — 87880 STREP A ASSAY W/OPTIC: CPT | Performed by: FAMILY MEDICINE

## 2025-01-22 RX ORDER — BROMPHENIRAMINE MALEATE, PSEUDOEPHEDRINE HYDROCHLORIDE, AND DEXTROMETHORPHAN HYDROBROMIDE 2; 30; 10 MG/5ML; MG/5ML; MG/5ML
5 SYRUP ORAL 4 TIMES DAILY PRN
Qty: 120 ML | Refills: 0 | Status: SHIPPED | OUTPATIENT
Start: 2025-01-22 | End: 2025-02-01

## 2025-01-22 NOTE — PATIENT INSTRUCTIONS
HOME CARE INSTRUCTIONS:   --- May use prescribed cough syrup up to 4 times a day as needed  --- gargle with lightly salted water several times a day  --- May take over-the-counter Tylenol for pain and fever control  --- Plenty of rest and sleep  --- Drink lots of fluids  --- Cover  your mouth and nose when coughing  or sneezing  --- Wash your hands often    SEEK FURTHER MEDICAL ATTENTION OR GO THE EMERGENCY ROOM IF:  --- Fever persists more than 3 days, or elevated over 104°  --- You have difficulty breathing or cannot catch your breath  --- Vomiting: unable to keep liquids, food or medications on stomach  --- Symptoms do not improve after 5-7  --- You Become listless, or get a stiff neck    Advised the patient to seek immediate Emergency Medical attention if symptoms fail to improve, worsen or any concerning symptoms arise.

## 2025-01-22 NOTE — PROGRESS NOTES
Subjective   Patient ID: Tiffanie Maldonado is a 59 y.o. female. They present today with a chief complaint of Sore Throat and Cough.    History of Present Illness  HPI  7 days of cough, sore throat, congestion, postnasal drip runny nose. No fevers have been noted. Patient denies shortness of breath, chest pain, or wheezing. No red eyes, eye pain, or discharge. They deny nausea vomiting or diarrhea. No known exposures to strep mono influenza, COVID-19 or pneumonia. No skin rashes are noted. Over-the-counter medications are offering minimal relief from symptoms.      Past Medical History  Allergies as of 01/22/2025 - Reviewed 01/22/2025   Allergen Reaction Noted    Grass pollen Unknown 11/18/2024    Meloxicam Other and Dizziness 03/13/2023    Moxifloxacin Dizziness, Other, and Unknown 03/13/2023    Doxycycline hyclate Rash 03/13/2023       (Not in a hospital admission)       Past Medical History:   Diagnosis Date    Anxiety     Influenza A 08/30/2023    Personal history of cervical dysplasia 09/08/2015    History of cervical dysplasia    Personal history of other diseases of the digestive system 09/08/2015    History of diverticulitis of colon    Strabismus        Past Surgical History:   Procedure Laterality Date    CERVICAL BIOPSY  W/ LOOP ELECTRODE EXCISION  09/08/2015    Cervical Loop Electrosurgical Excision (LEEP)    INFERIOR OBLIQUE MYECTOMY Left 03/19/2024    Left inferior oblique myectomy    OTHER SURGICAL HISTORY  11/18/2021    Wrist surgery    STRABISMUS SURGERY          reports that she has never smoked. She has never used smokeless tobacco. She reports current alcohol use of about 1.0 standard drink of alcohol per week. She reports that she does not use drugs.    Review of Systems  Review of Systems  As in history of present illness                             Objective    Vitals:    01/22/25 1830   BP: 116/74   BP Location: Right arm   Patient Position: Sitting   BP Cuff Size: Adult   Pulse: 64   Resp:  "20   Temp: 36.6 °C (97.9 °F)   TempSrc: Oral   SpO2: 94%   Weight: 63.5 kg (140 lb)   Height: 1.651 m (5' 5\")     No LMP recorded. Patient is postmenopausal.    Physical Exam  Gen.-alert cooperative and in no apparent distress  Head and face- no swelling redness, tenderness or rash  Eyes-EOMI, no redness or discharge is noted  ENT- bilateral nasal congestion with clear rhinorrhea and postnasal drip in oral pharynx  Neck- normal range of motion with no lymphadenopathy or mass.   Pulmonary- no respiratory distress noted. Lungs clear to auscultation without wheezes rhonchi or rales  Skin- no rashes discoloration or skin lesions noted  Lymphatic-- no lymph node swelling or tenderness appreciated  Procedures    Point of Care Test & Imaging Results from this visit  Results for orders placed or performed in visit on 01/22/25   POCT Covid-19 Rapid Antigen   Result Value Ref Range    POC SOY-COV-2 AG  Presumptive negative test for SARS-CoV-2 (no antigen detected)     Presumptive negative test for SARS-CoV-2 (no antigen detected)   POCT Influenza A/B manually resulted   Result Value Ref Range    POC Rapid Influenza A Negative Negative    POC Rapid Influenza B Negative Negative   POCT rapid strep A manually resulted   Result Value Ref Range    POC Rapid Strep Negative Negative      No results found.    Diagnostic study results (if any) were reviewed by Reyes Reina MD.    Assessment/Plan   Allergies, medications, history, and pertinent labs/EKGs/Imaging reviewed by Reyes Reina MD.     Medical Decision Making  At time of discharge patient was clinically well-appearing and HDS for outpatient management. The patient and/or family was educated regarding diagnosis, supportive care, OTC and Rx medications. The patient and/or family was given the opportunity to ask questions prior to discharge.  They verbalized understanding of my discussion of the plans for treatment, expected course, indications to return to  or seek further " evaluation in ED, and the need for timely follow up as directed.   They were provided with a work/school excuse if requested.    Orders and Diagnoses  Diagnoses and all orders for this visit:  Sore throat  -     POCT Covid-19 Rapid Antigen  -     POCT Influenza A/B manually resulted  -     POCT rapid strep A manually resulted  Viral URI  Acute cough      Medical Admin Record      Patient disposition: Home    Electronically signed by Reyes Reina MD  6:59 PM

## 2025-02-25 ENCOUNTER — APPOINTMENT (OUTPATIENT)
Dept: OPHTHALMOLOGY | Facility: CLINIC | Age: 60
End: 2025-02-25
Payer: OTHER GOVERNMENT

## 2025-02-25 DIAGNOSIS — H53.2 DIPLOPIA: ICD-10-CM

## 2025-02-25 DIAGNOSIS — H49.12 PARALYTIC STRABISMUS ASSOCIATED WITH LEFT TROCHLEAR NERVE PALSY: Primary | ICD-10-CM

## 2025-02-25 DIAGNOSIS — H52.13 HIGH MYOPIA, BILATERAL: ICD-10-CM

## 2025-02-25 DIAGNOSIS — H52.203 ASTIGMATISM OF BOTH EYES, UNSPECIFIED TYPE: ICD-10-CM

## 2025-02-25 PROCEDURE — 92060 SENSORIMOTOR EXAMINATION: CPT | Performed by: OPHTHALMOLOGY

## 2025-02-25 PROCEDURE — 99213 OFFICE O/P EST LOW 20 MIN: CPT | Performed by: OPHTHALMOLOGY

## 2025-02-25 ASSESSMENT — ENCOUNTER SYMPTOMS
GASTROINTESTINAL NEGATIVE: 0
CONSTITUTIONAL NEGATIVE: 0
PSYCHIATRIC NEGATIVE: 0
CARDIOVASCULAR NEGATIVE: 0
HEMATOLOGIC/LYMPHATIC NEGATIVE: 0
ALLERGIC/IMMUNOLOGIC NEGATIVE: 0
RESPIRATORY NEGATIVE: 0
EYES NEGATIVE: 1
MUSCULOSKELETAL NEGATIVE: 0
NEUROLOGICAL NEGATIVE: 0
ENDOCRINE NEGATIVE: 0

## 2025-02-25 ASSESSMENT — VISUAL ACUITY
METHOD: SNELLEN - LINEAR
CORRECTION_TYPE: GLASSES
OS_CC: 20/25
OD_CC: 20/20
OD_CC: 20/20
OS_CC: 20/20-

## 2025-02-25 ASSESSMENT — PACHYMETRY
OS_CT(UM): 503
OD_CT(UM): 525

## 2025-02-25 ASSESSMENT — SLIT LAMP EXAM - LIDS
COMMENTS: NORMAL
COMMENTS: NORMAL

## 2025-02-25 ASSESSMENT — REFRACTION_WEARINGRX
OD_ADD: +2.50
OS_SPHERE: -8.25
SPECS_TYPE: PAL
OS_AXIS: 090
OS_CYLINDER: +3.75
OD_AXIS: 100
OS_ADD: +2.50
OD_CYLINDER: +3.50
OD_SPHERE: -7.25

## 2025-02-25 ASSESSMENT — EXTERNAL EXAM - LEFT EYE: OS_EXAM: NORMAL

## 2025-02-25 ASSESSMENT — EXTERNAL EXAM - RIGHT EYE: OD_EXAM: NORMAL

## 2025-02-25 NOTE — PROGRESS NOTES
59 y.o. 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 History of (s/p) Left inferior oblique myectomy 3/19/24 and high myopia with astig OU.     Stable exam today. Minimal exophoria (3 PD) and patient asymptomatic and pleased with result.  Has some symptoms of dry eyes that are improved with artificial tears. Continue to use artificial tears PRN.    Follow up with Dr. Kemp as scheduled.   Follow up in strabismus clinic PRN.

## 2025-04-15 ENCOUNTER — APPOINTMENT (OUTPATIENT)
Dept: OPHTHALMOLOGY | Facility: CLINIC | Age: 60
End: 2025-04-15
Payer: OTHER GOVERNMENT

## 2025-04-15 DIAGNOSIS — H52.4 PRESBYOPIA: ICD-10-CM

## 2025-04-15 DIAGNOSIS — H52.13 MYOPIA OF BOTH EYES: ICD-10-CM

## 2025-04-15 DIAGNOSIS — H49.12 PARALYTIC STRABISMUS ASSOCIATED WITH LEFT TROCHLEAR NERVE PALSY: Primary | ICD-10-CM

## 2025-04-15 DIAGNOSIS — H04.123 DRY EYES: ICD-10-CM

## 2025-04-15 DIAGNOSIS — H52.223 REGULAR ASTIGMATISM OF BOTH EYES: ICD-10-CM

## 2025-04-15 PROCEDURE — 92014 COMPRE OPH EXAM EST PT 1/>: CPT | Performed by: OPHTHALMOLOGY

## 2025-04-15 PROCEDURE — 92015 DETERMINE REFRACTIVE STATE: CPT | Performed by: OPHTHALMOLOGY

## 2025-04-15 ASSESSMENT — ENCOUNTER SYMPTOMS
HEMATOLOGIC/LYMPHATIC NEGATIVE: 0
MUSCULOSKELETAL NEGATIVE: 0
GASTROINTESTINAL NEGATIVE: 0
PSYCHIATRIC NEGATIVE: 0
CONSTITUTIONAL NEGATIVE: 0
NEUROLOGICAL NEGATIVE: 0
ENDOCRINE NEGATIVE: 0
ALLERGIC/IMMUNOLOGIC NEGATIVE: 0
CARDIOVASCULAR NEGATIVE: 0
EYES NEGATIVE: 1
RESPIRATORY NEGATIVE: 0

## 2025-04-15 ASSESSMENT — REFRACTION_WEARINGRX
OD_ADD: +2.50
OD_CYLINDER: -3.50
OS_AXIS: 180
SPECS_TYPE: PAL
OD_SPHERE: -3.75
OS_SPHERE: -4.50
OD_AXIS: 010
OS_CYLINDER: -3.75
OS_ADD: +2.50

## 2025-04-15 ASSESSMENT — VISUAL ACUITY
METHOD: SNELLEN - LINEAR
CORRECTION_TYPE: GLASSES
OD_CC: 20/20
OS_CC: 20/20

## 2025-04-15 ASSESSMENT — REFRACTION_MANIFEST
OS_ADD: +2.50
OD_AXIS: 010
OS_AXIS: 180
OS_CYLINDER: -3.75
OS_SPHERE: -4.00
OD_SPHERE: -3.50
OD_CYLINDER: -3.50
OD_ADD: +2.50

## 2025-04-15 ASSESSMENT — PACHYMETRY
OD_CT(UM): 525
OS_CT(UM): 503

## 2025-04-15 ASSESSMENT — TONOMETRY
OS_IOP_MMHG: 19
OD_IOP_MMHG: 18
IOP_METHOD: GOLDMANN APPLANATION

## 2025-04-15 ASSESSMENT — CONF VISUAL FIELD
OD_SUPERIOR_NASAL_RESTRICTION: 0
OD_SUPERIOR_TEMPORAL_RESTRICTION: 0
OS_NORMAL: 1
OS_SUPERIOR_TEMPORAL_RESTRICTION: 0
OD_INFERIOR_NASAL_RESTRICTION: 0
OS_INFERIOR_NASAL_RESTRICTION: 0
OD_NORMAL: 1
OD_INFERIOR_TEMPORAL_RESTRICTION: 0
OS_SUPERIOR_NASAL_RESTRICTION: 0
OS_INFERIOR_TEMPORAL_RESTRICTION: 0

## 2025-04-15 ASSESSMENT — SLIT LAMP EXAM - LIDS
COMMENTS: NORMAL
COMMENTS: NORMAL

## 2025-04-15 ASSESSMENT — CUP TO DISC RATIO
OD_RATIO: 0.35
OS_RATIO: 0.35

## 2025-04-15 ASSESSMENT — EXTERNAL EXAM - RIGHT EYE: OD_EXAM: NORMAL

## 2025-04-15 ASSESSMENT — EXTERNAL EXAM - LEFT EYE: OS_EXAM: NORMAL

## 2025-04-15 NOTE — PROGRESS NOTES
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

## 2025-06-09 ENCOUNTER — APPOINTMENT (OUTPATIENT)
Dept: PRIMARY CARE | Facility: CLINIC | Age: 60
End: 2025-06-09
Payer: OTHER GOVERNMENT

## 2025-06-17 ENCOUNTER — APPOINTMENT (OUTPATIENT)
Dept: PRIMARY CARE | Facility: CLINIC | Age: 60
End: 2025-06-17
Payer: OTHER GOVERNMENT

## 2025-06-19 ENCOUNTER — APPOINTMENT (OUTPATIENT)
Dept: PRIMARY CARE | Facility: CLINIC | Age: 60
End: 2025-06-19
Payer: OTHER GOVERNMENT

## 2025-06-24 ENCOUNTER — APPOINTMENT (OUTPATIENT)
Dept: PRIMARY CARE | Facility: CLINIC | Age: 60
End: 2025-06-24
Payer: OTHER GOVERNMENT

## 2025-06-24 VITALS
HEIGHT: 65 IN | HEART RATE: 70 BPM | OXYGEN SATURATION: 95 % | BODY MASS INDEX: 24.66 KG/M2 | SYSTOLIC BLOOD PRESSURE: 126 MMHG | WEIGHT: 148 LBS | TEMPERATURE: 98.4 F | DIASTOLIC BLOOD PRESSURE: 70 MMHG

## 2025-06-24 DIAGNOSIS — Z12.39 ENCOUNTER FOR SCREENING FOR MALIGNANT NEOPLASM OF BREAST, UNSPECIFIED SCREENING MODALITY: ICD-10-CM

## 2025-06-24 DIAGNOSIS — R73.9 HYPERGLYCEMIA: ICD-10-CM

## 2025-06-24 DIAGNOSIS — E55.9 VITAMIN D DEFICIENCY: ICD-10-CM

## 2025-06-24 DIAGNOSIS — M77.11 LATERAL EPICONDYLITIS OF RIGHT ELBOW: ICD-10-CM

## 2025-06-24 DIAGNOSIS — E78.5 DYSLIPIDEMIA: ICD-10-CM

## 2025-06-24 DIAGNOSIS — H02.729 MADAROSIS OF EYELID, UNSPECIFIED LATERALITY: ICD-10-CM

## 2025-06-24 DIAGNOSIS — B35.1 ONYCHOMYCOSIS: ICD-10-CM

## 2025-06-24 DIAGNOSIS — R53.83 FATIGUE, UNSPECIFIED TYPE: ICD-10-CM

## 2025-06-24 PROCEDURE — 1036F TOBACCO NON-USER: CPT | Performed by: FAMILY MEDICINE

## 2025-06-24 PROCEDURE — 99214 OFFICE O/P EST MOD 30 MIN: CPT | Performed by: FAMILY MEDICINE

## 2025-06-24 PROCEDURE — 3008F BODY MASS INDEX DOCD: CPT | Performed by: FAMILY MEDICINE

## 2025-06-24 PROCEDURE — 20610 DRAIN/INJ JOINT/BURSA W/O US: CPT | Performed by: FAMILY MEDICINE

## 2025-06-24 RX ORDER — LIDOCAINE HYDROCHLORIDE 5 MG/ML
0.5 INJECTION, SOLUTION INFILTRATION; PERINEURAL
Status: COMPLETED | OUTPATIENT
Start: 2025-06-24 | End: 2025-06-24

## 2025-06-24 RX ORDER — BIMATOPROST 3 UG/ML
SOLUTION TOPICAL
Qty: 5 ML | Refills: 2 | Status: SHIPPED | OUTPATIENT
Start: 2025-06-24 | End: 2025-06-24

## 2025-06-24 RX ORDER — TERBINAFINE HYDROCHLORIDE 250 MG/1
250 TABLET ORAL DAILY
Qty: 30 TABLET | Refills: 2 | Status: SHIPPED | OUTPATIENT
Start: 2025-06-24

## 2025-06-24 RX ORDER — BIMATOPROST 3 UG/ML
SOLUTION TOPICAL
Qty: 5 ML | Refills: 2 | Status: SHIPPED | OUTPATIENT
Start: 2025-06-24 | End: 2026-06-24

## 2025-06-24 RX ORDER — CICLOPIROX 80 MG/ML
SOLUTION TOPICAL NIGHTLY
Qty: 6.6 ML | Refills: 3 | Status: SHIPPED | OUTPATIENT
Start: 2025-06-24

## 2025-06-24 RX ORDER — TRIAMCINOLONE ACETONIDE 40 MG/ML
80 INJECTION, SUSPENSION INTRA-ARTICULAR; INTRAMUSCULAR
Status: COMPLETED | OUTPATIENT
Start: 2025-06-24 | End: 2025-06-24

## 2025-06-24 RX ADMIN — LIDOCAINE HYDROCHLORIDE 0.5 ML: 5 INJECTION, SOLUTION INFILTRATION; PERINEURAL at 15:32

## 2025-06-24 RX ADMIN — TRIAMCINOLONE ACETONIDE 80 MG: 40 INJECTION, SUSPENSION INTRA-ARTICULAR; INTRAMUSCULAR at 15:32

## 2025-06-24 ASSESSMENT — ENCOUNTER SYMPTOMS
SORE THROAT: 0
POLYPHAGIA: 0
ARTHRALGIAS: 1
RECTAL PAIN: 0
AGITATION: 0
HEMATURIA: 0
FEVER: 0
MYALGIAS: 1
DEPRESSION: 0
DIZZINESS: 0
COUGH: 0
BLOOD IN STOOL: 0
NECK STIFFNESS: 0
DYSURIA: 0
PHOTOPHOBIA: 0
TROUBLE SWALLOWING: 0
COLOR CHANGE: 0
CHEST TIGHTNESS: 0
SINUS PAIN: 0
ADENOPATHY: 0
ABDOMINAL PAIN: 0
SINUS PRESSURE: 0
LOSS OF SENSATION IN FEET: 0
HEADACHES: 0
APPETITE CHANGE: 0
OCCASIONAL FEELINGS OF UNSTEADINESS: 1
SEIZURES: 0
EYE PAIN: 0
RHINORRHEA: 0
POLYDIPSIA: 0
DIARRHEA: 0
NERVOUS/ANXIOUS: 0
SLEEP DISTURBANCE: 0
CONFUSION: 0
DYSPHORIC MOOD: 0
FLANK PAIN: 0
SPEECH DIFFICULTY: 0
ABDOMINAL DISTENTION: 0
ACTIVITY CHANGE: 0
PALPITATIONS: 0
DECREASED CONCENTRATION: 0
STRIDOR: 0
CONSTITUTIONAL NEGATIVE: 1
FATIGUE: 0
SHORTNESS OF BREATH: 0
CONSTIPATION: 0

## 2025-06-24 NOTE — PROGRESS NOTES
"Subjective   Patient ID: Tiffanie Maldonado is a 59 y.o. female who presents for Arm Pain.    HPI   Patient is here for right arm pain. Ongoing x 1 1/2 months.  Review of Systems   Constitutional: Negative.  Negative for activity change, appetite change, fatigue and fever.   HENT:  Negative for congestion, dental problem, ear discharge, ear pain, mouth sores, rhinorrhea, sinus pressure, sinus pain, sore throat, tinnitus and trouble swallowing.    Eyes:  Negative for photophobia, pain and visual disturbance.   Respiratory:  Negative for cough, chest tightness, shortness of breath and stridor.    Cardiovascular:  Negative for chest pain and palpitations.   Gastrointestinal:  Negative for abdominal distention, abdominal pain, blood in stool, constipation, diarrhea and rectal pain.   Endocrine: Negative for cold intolerance, heat intolerance, polydipsia, polyphagia and polyuria.   Genitourinary:  Negative for dysuria, flank pain, hematuria and urgency.   Musculoskeletal:  Positive for arthralgias and myalgias. Negative for gait problem and neck stiffness.   Skin:  Negative for color change and rash.   Allergic/Immunologic: Negative for environmental allergies and food allergies.   Neurological:  Negative for dizziness, seizures, syncope, speech difficulty and headaches.   Hematological:  Negative for adenopathy.   Psychiatric/Behavioral:  Negative for agitation, confusion, decreased concentration, dysphoric mood and sleep disturbance. The patient is not nervous/anxious.        Objective   /70   Pulse 70   Temp 36.9 °C (98.4 °F) (Temporal)   Ht 1.651 m (5' 5\")   Wt 67.1 kg (148 lb)   SpO2 95%   BMI 24.63 kg/m²     Physical Exam  Vitals reviewed.   Constitutional:       General: She is not in acute distress.     Appearance: Normal appearance. She is normal weight. She is not ill-appearing or diaphoretic.   HENT:      Head: Normocephalic.      Right Ear: Tympanic membrane and external ear normal.      Left " Ear: Tympanic membrane and external ear normal.      Nose: Nose normal. No congestion.      Mouth/Throat:      Pharynx: No posterior oropharyngeal erythema.   Eyes:      General:         Right eye: No discharge.         Left eye: No discharge.      Extraocular Movements: Extraocular movements intact.      Conjunctiva/sclera: Conjunctivae normal.      Pupils: Pupils are equal, round, and reactive to light.   Cardiovascular:      Rate and Rhythm: Normal rate and regular rhythm.      Pulses: Normal pulses.      Heart sounds: Normal heart sounds. No murmur heard.  Pulmonary:      Effort: Pulmonary effort is normal. No respiratory distress.      Breath sounds: Normal breath sounds. No wheezing or rales.   Chest:      Chest wall: No tenderness.   Abdominal:      General: Abdomen is flat. Bowel sounds are normal. There is no distension.      Palpations: There is no mass.      Tenderness: There is no abdominal tenderness. There is no guarding.   Musculoskeletal:         General: No tenderness. Normal range of motion.      Cervical back: Normal range of motion and neck supple. No tenderness.      Right lower leg: No edema.      Left lower leg: No edema.   Skin:     General: Skin is dry.      Coloration: Skin is not jaundiced.      Findings: No bruising, erythema or rash.   Neurological:      General: No focal deficit present.      Mental Status: She is alert and oriented to person, place, and time. Mental status is at baseline.      Cranial Nerves: No cranial nerve deficit.      Sensory: No sensory deficit.      Coordination: Coordination normal.      Gait: Gait normal.   Psychiatric:         Mood and Affect: Mood normal.         Thought Content: Thought content normal.         Judgment: Judgment normal.         Assessment/Plan   Problem List Items Addressed This Visit           ICD-10-CM    Vitamin D deficiency E55.9    Relevant Orders    Vitamin D 25-Hydroxy,Total (for eval of Vitamin D levels)    Onychomycosis B35.1     Relevant Medications    terbinafine (LamISIL) 250 mg tablet    ciclopirox (Penlac) 8 % solution     Other Visit Diagnoses         Codes      Encounter for screening for malignant neoplasm of breast, unspecified screening modality     Z12.39    Relevant Orders    BI US breast complete bilateral      Lateral epicondylitis of right elbow     M77.11      Madarosis of eyelid, unspecified laterality     H02.729    Relevant Medications    bimatoprost (Latisse) 0.03 % ophthalmic solution      Dyslipidemia     E78.5    Relevant Orders    CBC and Auto Differential    Lipid Panel    Comprehensive Metabolic Panel      Fatigue, unspecified type     R53.83    Relevant Orders    Thyroxine, Total    Thyroxine, Free    Thyroid Stimulating Hormone      Hyperglycemia     R73.9    Relevant Orders    Hemoglobin A1C        Follow up based on blood work     Scribe Attestation  By signing my name below, I, JASON Acevedo , Scribe   attest that this documentation has been prepared under the direction and in the presence of Ag Hart DO.     All medical record entries made by the Scribe were at my direction and personally dictated by me. I have reviewed the chart and agree that the record accurately reflects my personal performance of the history, physical exam, discussion and plan.     Patient ID: Tiffanie Maldonado is a 59 y.o. female.    Joint Injection Large/Arthrocentesis (Right elbow) on 6/24/2025 3:32 PM  Indications: pain  Details: (27 in) needle, lateral approach  Medications: 80 mg triamcinolone acetonide 40 mg/mL; 0.5 mL lidocaine 5 mg/mL (0.5 %)  Outcome: tolerated well, no immediate complications  Procedure, treatment alternatives, risks and benefits explained, specific risks discussed. Consent was given by the patient. Immediately prior to procedure a time out was called to verify the correct patient, procedure, equipment, support staff and site/side marked as required. Patient was prepped and draped in the  usual sterile fashion.

## 2025-06-27 ENCOUNTER — TELEPHONE (OUTPATIENT)
Dept: PRIMARY CARE | Facility: CLINIC | Age: 60
End: 2025-06-27
Payer: OTHER GOVERNMENT

## 2025-06-27 DIAGNOSIS — M79.642 PAIN IN BOTH HANDS: ICD-10-CM

## 2025-06-27 DIAGNOSIS — R20.0 NUMBNESS IN BOTH HANDS: ICD-10-CM

## 2025-06-27 DIAGNOSIS — M79.641 PAIN IN BOTH HANDS: ICD-10-CM

## 2025-06-27 NOTE — TELEPHONE ENCOUNTER
DR ESPINOSA PT    PT PHONED OFFICE AND IS REQUESTING A REFERRAL TO PHYSICAL THERAPY RIGHT ARM/ ELBOW PAIN.

## 2025-06-27 NOTE — TELEPHONE ENCOUNTER
Pt's calling to inform clinical staff the reason she is requesting PT referral is because she believes her body seems to be rejecting cortisone injection. Pt says there was a lot of pain and numbness in hands afterwards/pink area. Pt was unable to use right hand at the time. Pt believes moving forward that therapy will suite her best.

## 2025-07-08 DIAGNOSIS — Z12.31 ENCOUNTER FOR SCREENING MAMMOGRAM FOR BREAST CANCER: ICD-10-CM

## 2026-04-14 ENCOUNTER — APPOINTMENT (OUTPATIENT)
Dept: OPHTHALMOLOGY | Facility: CLINIC | Age: 61
End: 2026-04-14
Payer: OTHER GOVERNMENT

## (undated) DEVICE — SOLUTION, BSS IRRIGATING 15ML

## (undated) DEVICE — GLOVE, SURGICAL, ENCORE, MICROPTIC, 7.5, PF, LATEX, BROWN

## (undated) DEVICE — Device

## (undated) DEVICE — CLEANER, WIPE, INSTRUMENT, 3.25 X 3.25 IN

## (undated) DEVICE — SPONGE, GAUZE, XRAY DECT, 16 PLY, 4 X 4, W/MASTER DMT,STERILE

## (undated) DEVICE — SUTURE, CTD, VICRYL, 6-0, TG1008

## (undated) DEVICE — DRESSING, GAUZE, PAD, 4 X 4 IN, STERILE

## (undated) DEVICE — DRESSING, TRANSPARENT, TEGADERM, 2-3/8 X 2-3/4 IN

## (undated) DEVICE — SUTURE, VICRYL, 8-0, 12 IN, TG1406, DA, VIOLET

## (undated) DEVICE — APPLICATOR, COTTON TIP, 3 IN, WOOD, STERILE

## (undated) DEVICE — CORD, ELECTROSURGICAL, BIPOLAR

## (undated) DEVICE — SYRINGE, MONOJECT, LUER LOCK, 3 CC, LF

## (undated) DEVICE — REST, HEAD, BAGEL, 9 IN